# Patient Record
Sex: FEMALE | Race: OTHER | HISPANIC OR LATINO
[De-identification: names, ages, dates, MRNs, and addresses within clinical notes are randomized per-mention and may not be internally consistent; named-entity substitution may affect disease eponyms.]

---

## 2017-08-01 PROBLEM — Z00.00 ENCOUNTER FOR PREVENTIVE HEALTH EXAMINATION: Status: ACTIVE | Noted: 2017-08-01

## 2017-08-09 ENCOUNTER — APPOINTMENT (OUTPATIENT)
Dept: ANTEPARTUM | Facility: CLINIC | Age: 22
End: 2017-08-09
Payer: MEDICAID

## 2017-08-09 ENCOUNTER — ASOB RESULT (OUTPATIENT)
Age: 22
End: 2017-08-09

## 2017-08-09 PROCEDURE — 76813 OB US NUCHAL MEAS 1 GEST: CPT

## 2017-08-09 PROCEDURE — 76801 OB US < 14 WKS SINGLE FETUS: CPT

## 2017-08-09 PROCEDURE — 36416 COLLJ CAPILLARY BLOOD SPEC: CPT

## 2017-09-11 ENCOUNTER — LABORATORY RESULT (OUTPATIENT)
Age: 22
End: 2017-09-11

## 2017-09-12 ENCOUNTER — ASOB RESULT (OUTPATIENT)
Age: 22
End: 2017-09-12

## 2017-09-12 ENCOUNTER — APPOINTMENT (OUTPATIENT)
Dept: ANTEPARTUM | Facility: CLINIC | Age: 22
End: 2017-09-12
Payer: MEDICAID

## 2017-09-12 PROCEDURE — 76805 OB US >/= 14 WKS SNGL FETUS: CPT

## 2017-09-12 PROCEDURE — 36415 COLL VENOUS BLD VENIPUNCTURE: CPT

## 2017-10-12 ENCOUNTER — APPOINTMENT (OUTPATIENT)
Dept: ANTEPARTUM | Facility: CLINIC | Age: 22
End: 2017-10-12
Payer: MEDICAID

## 2017-10-12 ENCOUNTER — ASOB RESULT (OUTPATIENT)
Age: 22
End: 2017-10-12

## 2017-10-12 PROCEDURE — 76817 TRANSVAGINAL US OBSTETRIC: CPT

## 2017-10-12 PROCEDURE — 76811 OB US DETAILED SNGL FETUS: CPT

## 2017-12-12 ENCOUNTER — ASOB RESULT (OUTPATIENT)
Age: 22
End: 2017-12-12

## 2017-12-12 ENCOUNTER — APPOINTMENT (OUTPATIENT)
Dept: ANTEPARTUM | Facility: CLINIC | Age: 22
End: 2017-12-12
Payer: MEDICAID

## 2017-12-12 PROCEDURE — 76816 OB US FOLLOW-UP PER FETUS: CPT

## 2017-12-12 PROCEDURE — 76819 FETAL BIOPHYS PROFIL W/O NST: CPT

## 2018-01-15 ENCOUNTER — OUTPATIENT (OUTPATIENT)
Dept: OUTPATIENT SERVICES | Facility: HOSPITAL | Age: 23
LOS: 1 days | End: 2018-01-15
Payer: MEDICAID

## 2018-01-15 DIAGNOSIS — O47.1 FALSE LABOR AT OR AFTER 37 COMPLETED WEEKS OF GESTATION: ICD-10-CM

## 2018-01-15 PROCEDURE — G0463: CPT

## 2018-01-15 PROCEDURE — 59025 FETAL NON-STRESS TEST: CPT

## 2018-02-06 ENCOUNTER — ASOB RESULT (OUTPATIENT)
Age: 23
End: 2018-02-06

## 2018-02-06 ENCOUNTER — APPOINTMENT (OUTPATIENT)
Dept: ANTEPARTUM | Facility: CLINIC | Age: 23
End: 2018-02-06
Payer: MEDICAID

## 2018-02-06 PROCEDURE — 76819 FETAL BIOPHYS PROFIL W/O NST: CPT

## 2018-02-06 PROCEDURE — 76816 OB US FOLLOW-UP PER FETUS: CPT

## 2018-02-17 ENCOUNTER — RESULT REVIEW (OUTPATIENT)
Age: 23
End: 2018-02-17

## 2018-02-17 ENCOUNTER — INPATIENT (INPATIENT)
Facility: HOSPITAL | Age: 23
LOS: 1 days | Discharge: HOME | End: 2018-02-19
Attending: OBSTETRICS & GYNECOLOGY | Admitting: OBSTETRICS & GYNECOLOGY
Payer: MEDICAID

## 2018-02-17 ENCOUNTER — TRANSCRIPTION ENCOUNTER (OUTPATIENT)
Age: 23
End: 2018-02-17

## 2018-02-17 VITALS — WEIGHT: 138.89 LBS | HEIGHT: 60 IN

## 2018-02-17 DIAGNOSIS — O47.1 FALSE LABOR AT OR AFTER 37 COMPLETED WEEKS OF GESTATION: ICD-10-CM

## 2018-02-17 LAB
ABO RH CONFIRMATION: SIGNIFICANT CHANGE UP
BASE EXCESS BLDCOV CALC-SCNC: -4.2 MMOL/L — LOW (ref -2–2)
BASOPHILS # BLD AUTO: 0 K/UL — SIGNIFICANT CHANGE UP (ref 0–0.2)
BASOPHILS NFR BLD AUTO: 0.1 % — SIGNIFICANT CHANGE UP (ref 0–2)
BLD GP AB SCN SERPL QL: SIGNIFICANT CHANGE UP
EOSINOPHIL # BLD AUTO: 0.1 K/UL — SIGNIFICANT CHANGE UP (ref 0–0.5)
EOSINOPHIL NFR BLD AUTO: 0.7 % — SIGNIFICANT CHANGE UP (ref 0–6)
GAS PNL BLDCOV: 7.34 — SIGNIFICANT CHANGE UP (ref 7.25–7.45)
HCO3 BLDCOV-SCNC: 20 MMOL/L — LOW (ref 21–29)
HCT VFR BLD CALC: 37 % — SIGNIFICANT CHANGE UP (ref 37–47)
HGB BLD-MCNC: 12.2 G/DL — SIGNIFICANT CHANGE UP (ref 12–16)
LYMPHOCYTES # BLD AUTO: 2.2 K/UL — SIGNIFICANT CHANGE UP (ref 1–4.8)
LYMPHOCYTES # BLD AUTO: 20.6 % — SIGNIFICANT CHANGE UP (ref 20–55)
MCHC RBC-ENTMCNC: 29.5 PG — SIGNIFICANT CHANGE UP (ref 27–31)
MCHC RBC-ENTMCNC: 33 G/DL — SIGNIFICANT CHANGE UP (ref 32–36)
MCV RBC AUTO: 89.6 FL — SIGNIFICANT CHANGE UP (ref 81–99)
MONOCYTES # BLD AUTO: 0.7 K/UL — SIGNIFICANT CHANGE UP (ref 0–0.8)
MONOCYTES NFR BLD AUTO: 6.6 % — SIGNIFICANT CHANGE UP (ref 3–10)
NEUTROPHILS # BLD AUTO: 7.7 K/UL — SIGNIFICANT CHANGE UP (ref 1.8–8)
NEUTROPHILS NFR BLD AUTO: 71.4 % — SIGNIFICANT CHANGE UP (ref 37–73)
PCO2 BLDCOV: 38.7 MMHG — SIGNIFICANT CHANGE UP (ref 29–53)
PLATELET # BLD AUTO: 229 K/UL — SIGNIFICANT CHANGE UP (ref 150–400)
PO2 BLDCOA: 24.7 MMHG — SIGNIFICANT CHANGE UP (ref 17–41)
RBC # BLD: 4.13 M/UL — LOW (ref 4.4–5.2)
RBC # FLD: 13.4 % — SIGNIFICANT CHANGE UP (ref 11–15.6)
SAO2 % BLDCOV: SIGNIFICANT CHANGE UP
TYPE + AB SCN PNL BLD: SIGNIFICANT CHANGE UP
WBC # BLD: 10.8 K/UL — SIGNIFICANT CHANGE UP (ref 4.8–10.8)
WBC # FLD AUTO: 10.8 K/UL — SIGNIFICANT CHANGE UP (ref 4.8–10.8)

## 2018-02-17 PROCEDURE — 88307 TISSUE EXAM BY PATHOLOGIST: CPT | Mod: 26

## 2018-02-17 RX ORDER — LANOLIN
1 OINTMENT (GRAM) TOPICAL EVERY 6 HOURS
Qty: 0 | Refills: 0 | Status: DISCONTINUED | OUTPATIENT
Start: 2018-02-17 | End: 2018-02-19

## 2018-02-17 RX ORDER — GLYCERIN ADULT
1 SUPPOSITORY, RECTAL RECTAL AT BEDTIME
Qty: 0 | Refills: 0 | Status: DISCONTINUED | OUTPATIENT
Start: 2018-02-17 | End: 2018-02-19

## 2018-02-17 RX ORDER — CEFAZOLIN SODIUM 1 G
1000 VIAL (EA) INJECTION EVERY 8 HOURS
Qty: 0 | Refills: 0 | Status: DISCONTINUED | OUTPATIENT
Start: 2018-02-18 | End: 2018-02-19

## 2018-02-17 RX ORDER — SODIUM CHLORIDE 9 MG/ML
1000 INJECTION, SOLUTION INTRAVENOUS
Qty: 0 | Refills: 0 | Status: DISCONTINUED | OUTPATIENT
Start: 2018-02-17 | End: 2018-02-18

## 2018-02-17 RX ORDER — AER TRAVELER 0.5 G/1
1 SOLUTION RECTAL; TOPICAL EVERY 4 HOURS
Qty: 0 | Refills: 0 | Status: DISCONTINUED | OUTPATIENT
Start: 2018-02-17 | End: 2018-02-19

## 2018-02-17 RX ORDER — MORPHINE SULFATE 50 MG/1
5 CAPSULE, EXTENDED RELEASE ORAL ONCE
Qty: 0 | Refills: 0 | Status: DISCONTINUED | OUTPATIENT
Start: 2018-02-17 | End: 2018-02-17

## 2018-02-17 RX ORDER — SODIUM CHLORIDE 9 MG/ML
3 INJECTION INTRAMUSCULAR; INTRAVENOUS; SUBCUTANEOUS EVERY 8 HOURS
Qty: 0 | Refills: 0 | Status: DISCONTINUED | OUTPATIENT
Start: 2018-02-17 | End: 2018-02-19

## 2018-02-17 RX ORDER — OXYTOCIN 10 UNIT/ML
333.33 VIAL (ML) INJECTION
Qty: 20 | Refills: 0 | Status: COMPLETED | OUTPATIENT
Start: 2018-02-17 | End: 2018-02-17

## 2018-02-17 RX ORDER — SODIUM CHLORIDE 9 MG/ML
1000 INJECTION, SOLUTION INTRAVENOUS ONCE
Qty: 0 | Refills: 0 | Status: DISCONTINUED | OUTPATIENT
Start: 2018-02-17 | End: 2018-02-18

## 2018-02-17 RX ORDER — OXYTOCIN 10 UNIT/ML
333.33 VIAL (ML) INJECTION
Qty: 20 | Refills: 0 | Status: DISCONTINUED | OUTPATIENT
Start: 2018-02-17 | End: 2018-02-18

## 2018-02-17 RX ORDER — ACETAMINOPHEN 500 MG
650 TABLET ORAL EVERY 6 HOURS
Qty: 0 | Refills: 0 | Status: DISCONTINUED | OUTPATIENT
Start: 2018-02-17 | End: 2018-02-19

## 2018-02-17 RX ORDER — OXYTOCIN 10 UNIT/ML
333.33 VIAL (ML) INJECTION
Qty: 20 | Refills: 0 | Status: DISCONTINUED | OUTPATIENT
Start: 2018-02-17 | End: 2018-02-17

## 2018-02-17 RX ORDER — IBUPROFEN 200 MG
600 TABLET ORAL EVERY 6 HOURS
Qty: 0 | Refills: 0 | Status: DISCONTINUED | OUTPATIENT
Start: 2018-02-17 | End: 2018-02-19

## 2018-02-17 RX ORDER — OXYTOCIN 10 UNIT/ML
41.67 VIAL (ML) INJECTION
Qty: 20 | Refills: 0 | Status: DISCONTINUED | OUTPATIENT
Start: 2018-02-17 | End: 2018-02-19

## 2018-02-17 RX ORDER — OXYCODONE AND ACETAMINOPHEN 5; 325 MG/1; MG/1
2 TABLET ORAL EVERY 6 HOURS
Qty: 0 | Refills: 0 | Status: DISCONTINUED | OUTPATIENT
Start: 2018-02-17 | End: 2018-02-19

## 2018-02-17 RX ORDER — SODIUM CHLORIDE 9 MG/ML
1000 INJECTION, SOLUTION INTRAVENOUS ONCE
Qty: 0 | Refills: 0 | Status: DISCONTINUED | OUTPATIENT
Start: 2018-02-17 | End: 2018-02-17

## 2018-02-17 RX ORDER — DIPHENHYDRAMINE HCL 50 MG
25 CAPSULE ORAL EVERY 6 HOURS
Qty: 0 | Refills: 0 | Status: DISCONTINUED | OUTPATIENT
Start: 2018-02-17 | End: 2018-02-19

## 2018-02-17 RX ORDER — TETANUS TOXOID, REDUCED DIPHTHERIA TOXOID AND ACELLULAR PERTUSSIS VACCINE, ADSORBED 5; 2.5; 8; 8; 2.5 [IU]/.5ML; [IU]/.5ML; UG/.5ML; UG/.5ML; UG/.5ML
0.5 SUSPENSION INTRAMUSCULAR ONCE
Qty: 0 | Refills: 0 | Status: DISCONTINUED | OUTPATIENT
Start: 2018-02-17 | End: 2018-02-19

## 2018-02-17 RX ORDER — SODIUM CHLORIDE 9 MG/ML
1000 INJECTION, SOLUTION INTRAVENOUS
Qty: 0 | Refills: 0 | Status: DISCONTINUED | OUTPATIENT
Start: 2018-02-17 | End: 2018-02-17

## 2018-02-17 RX ORDER — DIBUCAINE 1 %
1 OINTMENT (GRAM) RECTAL EVERY 4 HOURS
Qty: 0 | Refills: 0 | Status: DISCONTINUED | OUTPATIENT
Start: 2018-02-17 | End: 2018-02-19

## 2018-02-17 RX ORDER — PRAMOXINE HYDROCHLORIDE 150 MG/15G
1 AEROSOL, FOAM RECTAL EVERY 4 HOURS
Qty: 0 | Refills: 0 | Status: DISCONTINUED | OUTPATIENT
Start: 2018-02-17 | End: 2018-02-19

## 2018-02-17 RX ORDER — OXYTOCIN 10 UNIT/ML
333.33 VIAL (ML) INJECTION
Qty: 20 | Refills: 0 | Status: COMPLETED | OUTPATIENT
Start: 2018-02-17

## 2018-02-17 RX ORDER — CEFAZOLIN SODIUM 1 G
VIAL (EA) INJECTION
Qty: 0 | Refills: 0 | Status: DISCONTINUED | OUTPATIENT
Start: 2018-02-17 | End: 2018-02-19

## 2018-02-17 RX ORDER — HYDROCORTISONE 1 %
1 OINTMENT (GRAM) TOPICAL EVERY 4 HOURS
Qty: 0 | Refills: 0 | Status: DISCONTINUED | OUTPATIENT
Start: 2018-02-17 | End: 2018-02-19

## 2018-02-17 RX ORDER — DOCUSATE SODIUM 100 MG
100 CAPSULE ORAL
Qty: 0 | Refills: 0 | Status: DISCONTINUED | OUTPATIENT
Start: 2018-02-17 | End: 2018-02-19

## 2018-02-17 RX ORDER — CITRIC ACID/SODIUM CITRATE 300-500 MG
30 SOLUTION, ORAL ORAL ONCE
Qty: 0 | Refills: 0 | Status: DISCONTINUED | OUTPATIENT
Start: 2018-02-17 | End: 2018-02-17

## 2018-02-17 RX ORDER — CITRIC ACID/SODIUM CITRATE 300-500 MG
30 SOLUTION, ORAL ORAL ONCE
Qty: 0 | Refills: 0 | Status: DISCONTINUED | OUTPATIENT
Start: 2018-02-17 | End: 2018-02-18

## 2018-02-17 RX ORDER — CEFAZOLIN SODIUM 1 G
1000 VIAL (EA) INJECTION ONCE
Qty: 0 | Refills: 0 | Status: COMPLETED | OUTPATIENT
Start: 2018-02-17 | End: 2018-02-17

## 2018-02-17 RX ORDER — MAGNESIUM HYDROXIDE 400 MG/1
30 TABLET, CHEWABLE ORAL
Qty: 0 | Refills: 0 | Status: DISCONTINUED | OUTPATIENT
Start: 2018-02-17 | End: 2018-02-19

## 2018-02-17 RX ORDER — SODIUM CHLORIDE 9 MG/ML
1000 INJECTION, SOLUTION INTRAVENOUS ONCE
Qty: 0 | Refills: 0 | Status: DISCONTINUED | OUTPATIENT
Start: 2018-02-17 | End: 2018-02-19

## 2018-02-17 RX ORDER — SIMETHICONE 80 MG/1
80 TABLET, CHEWABLE ORAL EVERY 6 HOURS
Qty: 0 | Refills: 0 | Status: DISCONTINUED | OUTPATIENT
Start: 2018-02-17 | End: 2018-02-19

## 2018-02-17 RX ADMIN — SODIUM CHLORIDE 125 MILLILITER(S): 9 INJECTION, SOLUTION INTRAVENOUS at 20:16

## 2018-02-17 RX ADMIN — MORPHINE SULFATE 5 MILLIGRAM(S): 50 CAPSULE, EXTENDED RELEASE ORAL at 20:30

## 2018-02-17 RX ADMIN — Medication 1000 MILLIUNIT(S)/MIN: at 23:21

## 2018-02-17 RX ADMIN — Medication 100 MILLIGRAM(S): at 22:17

## 2018-02-17 RX ADMIN — MORPHINE SULFATE 5 MILLIGRAM(S): 50 CAPSULE, EXTENDED RELEASE ORAL at 21:00

## 2018-02-18 LAB
ANISOCYTOSIS BLD QL: SLIGHT — SIGNIFICANT CHANGE UP
BASOPHILS # BLD AUTO: 0 K/UL — SIGNIFICANT CHANGE UP (ref 0–0.2)
BASOPHILS NFR BLD AUTO: 0.1 % — SIGNIFICANT CHANGE UP (ref 0–2)
EOSINOPHIL # BLD AUTO: 0.1 K/UL — SIGNIFICANT CHANGE UP (ref 0–0.5)
EOSINOPHIL NFR BLD AUTO: 0.6 % — SIGNIFICANT CHANGE UP (ref 0–6)
HCT VFR BLD CALC: 23.2 % — LOW (ref 37–47)
HCT VFR BLD CALC: 28.6 % — LOW (ref 37–47)
HGB BLD-MCNC: 7.7 G/DL — LOW (ref 12–16)
HGB BLD-MCNC: 9.4 G/DL — LOW (ref 12–16)
HYPOCHROMIA BLD QL: SLIGHT — SIGNIFICANT CHANGE UP
LYMPHOCYTES # BLD AUTO: 18.9 % — LOW (ref 20–55)
LYMPHOCYTES # BLD AUTO: 2.5 K/UL — SIGNIFICANT CHANGE UP (ref 1–4.8)
MACROCYTES BLD QL: SLIGHT — SIGNIFICANT CHANGE UP
MCHC RBC-ENTMCNC: 29.7 PG — SIGNIFICANT CHANGE UP (ref 27–31)
MCHC RBC-ENTMCNC: 33.2 G/DL — SIGNIFICANT CHANGE UP (ref 32–36)
MCV RBC AUTO: 89.6 FL — SIGNIFICANT CHANGE UP (ref 81–99)
MICROCYTES BLD QL: SLIGHT — SIGNIFICANT CHANGE UP
MONOCYTES # BLD AUTO: 1 K/UL — HIGH (ref 0–0.8)
MONOCYTES NFR BLD AUTO: 7.6 % — SIGNIFICANT CHANGE UP (ref 3–10)
NEUTROPHILS # BLD AUTO: 9.5 K/UL — HIGH (ref 1.8–8)
NEUTROPHILS NFR BLD AUTO: 72.4 % — SIGNIFICANT CHANGE UP (ref 37–73)
PLAT MORPH BLD: NORMAL — SIGNIFICANT CHANGE UP
PLATELET # BLD AUTO: 166 K/UL — SIGNIFICANT CHANGE UP (ref 150–400)
POIKILOCYTOSIS BLD QL AUTO: SLIGHT — SIGNIFICANT CHANGE UP
RBC # BLD: 2.59 M/UL — LOW (ref 4.4–5.2)
RBC # FLD: 13.1 % — SIGNIFICANT CHANGE UP (ref 11–15.6)
RBC BLD AUTO: ABNORMAL
WBC # BLD: 13.1 K/UL — HIGH (ref 4.8–10.8)
WBC # FLD AUTO: 13.1 K/UL — HIGH (ref 4.8–10.8)

## 2018-02-18 RX ADMIN — Medication 100 MILLIGRAM(S): at 22:15

## 2018-02-18 RX ADMIN — Medication 100 MILLIGRAM(S): at 06:05

## 2018-02-18 RX ADMIN — Medication 600 MILLIGRAM(S): at 02:32

## 2018-02-18 RX ADMIN — Medication 600 MILLIGRAM(S): at 03:02

## 2018-02-18 RX ADMIN — Medication 100 MILLIGRAM(S): at 15:31

## 2018-02-18 RX ADMIN — Medication 1 TABLET(S): at 14:10

## 2018-02-18 RX ADMIN — SIMETHICONE 80 MILLIGRAM(S): 80 TABLET, CHEWABLE ORAL at 23:31

## 2018-02-18 RX ADMIN — SODIUM CHLORIDE 3 MILLILITER(S): 9 INJECTION INTRAMUSCULAR; INTRAVENOUS; SUBCUTANEOUS at 06:05

## 2018-02-18 RX ADMIN — SODIUM CHLORIDE 3 MILLILITER(S): 9 INJECTION INTRAMUSCULAR; INTRAVENOUS; SUBCUTANEOUS at 22:00

## 2018-02-18 RX ADMIN — SODIUM CHLORIDE 3 MILLILITER(S): 9 INJECTION INTRAMUSCULAR; INTRAVENOUS; SUBCUTANEOUS at 14:11

## 2018-02-18 RX ADMIN — SODIUM CHLORIDE 3 MILLILITER(S): 9 INJECTION INTRAMUSCULAR; INTRAVENOUS; SUBCUTANEOUS at 20:00

## 2018-02-18 RX ADMIN — SIMETHICONE 80 MILLIGRAM(S): 80 TABLET, CHEWABLE ORAL at 14:11

## 2018-02-18 NOTE — PROGRESS NOTE ADULT - ATTENDING COMMENTS
PPD#1  doing well  meeting milestones  currently on ancef for manual removal of placenta  ambulation encouraged  continue current PP care  dispo- home tomorrow

## 2018-02-18 NOTE — DISCHARGE NOTE OB - PLAN OF CARE
Reached Please follow up at [facility] in 6 weeks for postpartum check, take prenatal vitamins as prescribed, continue breast feeding and mother baby bonding. Placenta delivered After the delivery of your baby, the placenta didn't deliver spontaneously after 30 minutes. Therefore, it was necessary to extract it manually. A bedside ultrasound was done to verify that no remnants were left inside the uterus. Given the retained placenta, that caused you to have more bleeding that usual and your hemoglobin levels decreased after your delivery. You need to continue taking iron pills at home, you can take them with orange juice to increase absorption, iron will help you replace the lost hemoglobin. You should also try to eat protein-rich foods such as dairy (milk, cheese), eggs, and meats.

## 2018-02-18 NOTE — DISCHARGE NOTE OB - CARE PLAN
Principal Discharge DX:	Normal spontaneous vaginal delivery  Goal:	Reached  Assessment and plan of treatment:	Please follow up at [facility] in 6 weeks for postpartum check, take prenatal vitamins as prescribed, continue breast feeding and mother baby bonding.  Secondary Diagnosis:	Retained placenta, delivered  Goal:	Placenta delivered  Assessment and plan of treatment:	After the delivery of your baby, the placenta didn't deliver spontaneously after 30 minutes. Therefore, it was necessary to extract it manually. A bedside ultrasound was done to verify that no remnants were left inside the uterus.  Secondary Diagnosis:	Anemia due to blood loss, acute  Assessment and plan of treatment:	Given the retained placenta, that caused you to have more bleeding that usual and your hemoglobin levels decreased after your delivery. You need to continue taking iron pills at home, you can take them with orange juice to increase absorption, iron will help you replace the lost hemoglobin. You should also try to eat protein-rich foods such as dairy (milk, cheese), eggs, and meats.

## 2018-02-18 NOTE — PROGRESS NOTE ADULT - ASSESSMENT
22y years old.  status post spontaneous vaginal delivery at 39 1/7 weeks gestation. Postpartum day # 1 without complications

## 2018-02-18 NOTE — DISCHARGE NOTE OB - MATERIALS PROVIDED
Back To Sleep Handout/Breastfeeding Guide and Packet/MMR Vaccination (VIS Pub Date: 2012)/Guide to Postpartum Care/[facility] Hearing Screen Program/Discharge Medication Information for Patients and Families Pocket Guide/Birth Certificate Instructions/Tdap Vaccination (VIS Pub Date: 2012)/Vaccinations/Breastfeeding Mother’s Support Group Information/[facility]  Screening Program/  Immunization Record/Breastfeeding Log/Shaken Baby Prevention Handout

## 2018-02-18 NOTE — PROGRESS NOTE ADULT - SUBJECTIVE AND OBJECTIVE BOX
Postpartum progress note.  22y years old. G__P__ status post spontaneous vaginal delivery at ___ weeks gestation. Postpartum day # 1    Patient seen and examined at bedside, no acute overnight events.    Subjective: Patient is ambulating, tolerating PO intake of regular diet, voiding, passing flatus, ____ bowel movements. No breast tenderness. _______ feeding. Pain is well controlled. She reports ________ vaginal bleeding, has changed sanitary pads ___ times during the night _______ soaked. Patient denies headache, nausea/vomiting, shortness of breath, fever, chills or calf pain.     Objective:   Vital Signs: Vital Signs Last 24 Hrs  T(C): 37.4 (18 Feb 2018 01:15), Max: 37.4 (18 Feb 2018 01:15)  T(F): 99.4 (18 Feb 2018 01:15), Max: 99.4 (18 Feb 2018 01:15)  HR: 108 (18 Feb 2018 01:15) (76 - 123)  BP: 117/80 (18 Feb 2018 01:15) (95/54 - 129/90)  BP(mean): --  RR: 18 (18 Feb 2018 01:15) (16 - 20)  SpO2: --    Physical Examination:  General: No acute distress.  Lungs: Clear to auscultation bilaterally, no adventitious sounds.  Cardiovascular: Regular rate and rhythm, normal S1/S2, no murmurs.  Breast: ___(no tenderness or engorgement, no abnormal discharge)___  Abdomen: Bowel sounds present, soft, non distended, minimally tender, fundus firm at ____ cm below umbilicus.  Pelvic: Minimal lochia rubra.  Extremities: No edema. No calf tenderness, (-) Oswaldo's sign.    Labs:                        9.4    x     )-----------( x        ( 18 Feb 2018 00:47 )             28.6       Medication:  MEDICATIONS  (STANDING):  ceFAZolin   IVPB      ceFAZolin   IVPB 1000 milliGRAM(s) IV Intermittent every 8 hours  diphtheria/tetanus/pertussis (acellular) Vaccine (ADAcel) 0.5 milliLiter(s) IntraMuscular once  lactated ringers Bolus 1000 milliLiter(s) IV Bolus once  oxytocin Infusion 41.667 milliUNIT(s)/Min (125 mL/Hr) IV Continuous <Continuous>  prenatal multivitamin 1 Tablet(s) Oral daily  sodium chloride 0.9% lock flush 3 milliLiter(s) IV Push every 8 hours    MEDICATIONS  (PRN):  acetaminophen   Tablet 650 milliGRAM(s) Oral every 6 hours PRN For Temp greater than 38.5 C (101.3 F)  acetaminophen   Tablet. 650 milliGRAM(s) Oral every 6 hours PRN Mild Pain  dibucaine 1% Ointment 1 Application(s) Topical every 4 hours PRN Perineal Discomfort  diphenhydrAMINE   Capsule 25 milliGRAM(s) Oral every 6 hours PRN Itching  docusate sodium 100 milliGRAM(s) Oral two times a day PRN Stool Softening  glycerin Suppository - Adult 1 Suppository(s) Rectal at bedtime PRN Constipation  hydrocortisone 1% Cream 1 Application(s) Topical every 4 hours PRN Moderate to Severe Perineal Pain  ibuprofen  Tablet 600 milliGRAM(s) Oral every 6 hours PRN Moderate Pain  lanolin Ointment 1 Application(s) Topical every 6 hours PRN Sore Nipples  magnesium hydroxide Suspension 30 milliLiter(s) Oral two times a day PRN Constipation  oxyCODONE    5 mG/acetaminophen 325 mG 2 Tablet(s) Oral every 6 hours PRN Severe Pain  pramoxine 1%/zinc 5% Cream 1 Application(s) Topical every 4 hours PRN Moderate to Severe Perineal Pain  simethicone 80 milliGRAM(s) Chew every 6 hours PRN Gas  witch hazel Pads 1 Application(s) Topical every 4 hours PRN Perineal Discomfort      Assessment:  22y years old. G__P__ status post spontaneous vaginal delivery at ___ weeks gestation. Postpartum day # 1 without complications    Plan:  Routine Post partum care  Discharge home in AM  Circumcision today

## 2018-02-18 NOTE — PROGRESS NOTE ADULT - SUBJECTIVE AND OBJECTIVE BOX
Postpartum progress note.  22y years old.  status post spontaneous vaginal delivery at 39 1/7 weeks gestation. Postpartum day # 1    Patient seen and examined at bedside, no acute overnight events.    Subjective: Patient is ambulating, tolerating PO intake of regular diet, voiding, passing flatus, hasn't had any bowel movements since the delivery but is passing flatus. No breast tenderness. Breast and formula feeding. Pain is well controlled. She reports minimal vaginal bleeding, has changed sanitary pads twice overnight, partially soaked. Patient denies headache, nausea/vomiting, shortness of breath, fever, chills or calf pain.     Objective:   Vital Signs: Vital Signs Last 24 Hrs  T(C): 36.9 (2018 06:04), Max: 37.4 (2018 01:15)  T(F): 98.4 (2018 06:04), Max: 99.4 (2018 01:15)  HR: 87 (2018 06:04) (76 - 123)  BP: 96/59 (2018 06:04) (95/54 - 129/90)  BP(mean): --  RR: 16 (2018 06:04) (16 - 20)  SpO2: --    Physical Examination:  General: No acute distress.  Lungs: Clear to auscultation bilaterally, no adventitious sounds.  Cardiovascular: Regular rate and rhythm, normal S1/S2, no murmurs.  Breast: No tenderness  Abdomen: Bowel sounds present, soft, non distended, minimally tender, fundus firm at level of umbilicus.  Pelvic: Minimal lochia rubra.  Extremities: No edema. No calf tenderness, (-) Oswaldo's sign.    Labs:                        9.4    x     )-----------( x        ( 2018 00:47 )             28.6       Medication:  MEDICATIONS  (STANDING):  ceFAZolin   IVPB      ceFAZolin   IVPB 1000 milliGRAM(s) IV Intermittent every 8 hours  diphtheria/tetanus/pertussis (acellular) Vaccine (ADAcel) 0.5 milliLiter(s) IntraMuscular once  lactated ringers Bolus 1000 milliLiter(s) IV Bolus once  oxytocin Infusion 41.667 milliUNIT(s)/Min (125 mL/Hr) IV Continuous <Continuous>  prenatal multivitamin 1 Tablet(s) Oral daily  sodium chloride 0.9% lock flush 3 milliLiter(s) IV Push every 8 hours    MEDICATIONS  (PRN):  acetaminophen   Tablet 650 milliGRAM(s) Oral every 6 hours PRN For Temp greater than 38.5 C (101.3 F)  acetaminophen   Tablet. 650 milliGRAM(s) Oral every 6 hours PRN Mild Pain  dibucaine 1% Ointment 1 Application(s) Topical every 4 hours PRN Perineal Discomfort  diphenhydrAMINE   Capsule 25 milliGRAM(s) Oral every 6 hours PRN Itching  docusate sodium 100 milliGRAM(s) Oral two times a day PRN Stool Softening  glycerin Suppository - Adult 1 Suppository(s) Rectal at bedtime PRN Constipation  hydrocortisone 1% Cream 1 Application(s) Topical every 4 hours PRN Moderate to Severe Perineal Pain  ibuprofen  Tablet 600 milliGRAM(s) Oral every 6 hours PRN Moderate Pain  lanolin Ointment 1 Application(s) Topical every 6 hours PRN Sore Nipples  magnesium hydroxide Suspension 30 milliLiter(s) Oral two times a day PRN Constipation  oxyCODONE    5 mG/acetaminophen 325 mG 2 Tablet(s) Oral every 6 hours PRN Severe Pain  pramoxine 1%/zinc 5% Cream 1 Application(s) Topical every 4 hours PRN Moderate to Severe Perineal Pain  simethicone 80 milliGRAM(s) Chew every 6 hours PRN Gas  witch hazel Pads 1 Application(s) Topical every 4 hours PRN Perineal Discomfort

## 2018-02-18 NOTE — DISCHARGE NOTE OB - MEDICATION SUMMARY - MEDICATIONS TO TAKE
I will START or STAY ON the medications listed below when I get home from the hospital:    ibuprofen 600 mg oral tablet  -- 1 tab(s) by mouth every 6 hours, As needed, Moderate Pain  -- Indication: For Pain    Prenatal Multivitamins with Folic Acid 1 mg oral tablet  -- 1 tab(s) by mouth once a day  -- Indication: For Supplement    ferrous sulfate 325 mg (65 mg elemental iron) oral tablet  -- 1 tab(s) by mouth 3 times a day   -- Check with your doctor before becoming pregnant.  Do not chew, break, or crush.  May discolor urine or feces.    -- Indication: For Supplement

## 2018-02-18 NOTE — DISCHARGE NOTE OB - PATIENT PORTAL LINK FT
You can access the [facility] Patient Portal, offered by [facility], by registering with the following website: [email or web address]

## 2018-02-18 NOTE — DISCHARGE NOTE OB - CARE PROVIDER_API CALL
Sylvie Awad), Obstetrics and Gynecology  [facility] [place] [address]  Phone: [phone]  Fax: [phone]

## 2018-02-18 NOTE — DISCHARGE NOTE OB - HOSPITAL COURSE
Patient is a 22 years old now  who delivered via  at 39 1/7 weeks a live female infant weighting 3010g in cephalic presentation with Apgar scores 9 & 9. Placenta did not deliver spontaneously after 30 minutes and, after IV sedation, it was removed manually, bedside US done and no products were seen. Increase bleeding was noted and she received one dose of Methergine IM with good response. Patient's Hb & Hct are 7.7g/dl and 23.2 % respectively. Patient is ambulating, voiding, tolerating PO intake and is having flatus. She is stable and ready for discharge home. F/u at [facility] in 6 weeks for postpartum check

## 2018-02-18 NOTE — PROGRESS NOTE ADULT - SUBJECTIVE AND OBJECTIVE BOX
22y Female s/p   Manual extraction of retained placenta under  MAC IV sedation on 2/17/18    T(C): 36.8 (02-18-18 @ 09:26), Max: 37.4 (02-18-18 @ 01:15)  HR: 90 (02-18-18 @ 09:26) (76 - 123)  BP: 92/46 (02-18-18 @ 09:26) (92/46 - 129/90)  RR: 18 (02-18-18 @ 09:26) (16 - 20)  SpO2: --  Wt(kg): --    Pt seen, doing well, no anesthesia complications or complaints noted or reported.   No Nausea  Pain well controlled

## 2018-02-18 NOTE — PROGRESS NOTE ADULT - PROBLEM SELECTOR PLAN 2
S/p manual extraction of placenta  Patient to receive Ancef 1g p3ilodg for 24 hours.  F/u H&H: 9.4g/dL and 28.6% respectively, monitor for symptomatic anemia.

## 2018-02-19 VITALS
HEART RATE: 89 BPM | RESPIRATION RATE: 18 BRPM | DIASTOLIC BLOOD PRESSURE: 72 MMHG | SYSTOLIC BLOOD PRESSURE: 114 MMHG | TEMPERATURE: 98 F

## 2018-02-19 DIAGNOSIS — D62 ACUTE POSTHEMORRHAGIC ANEMIA: ICD-10-CM

## 2018-02-19 LAB
HCT VFR BLD CALC: 22.3 % — LOW (ref 37–47)
HGB BLD-MCNC: 7.3 G/DL — LOW (ref 12–16)
MCHC RBC-ENTMCNC: 29.7 PG — SIGNIFICANT CHANGE UP (ref 27–31)
MCHC RBC-ENTMCNC: 32.7 G/DL — SIGNIFICANT CHANGE UP (ref 32–36)
MCV RBC AUTO: 90.7 FL — SIGNIFICANT CHANGE UP (ref 81–99)
PLATELET # BLD AUTO: 159 K/UL — SIGNIFICANT CHANGE UP (ref 150–400)
RBC # BLD: 2.46 M/UL — LOW (ref 4.4–5.2)
RBC # FLD: 13.8 % — SIGNIFICANT CHANGE UP (ref 11–15.6)
T PALLIDUM AB TITR SER: NEGATIVE — SIGNIFICANT CHANGE UP
WBC # BLD: 10 K/UL — SIGNIFICANT CHANGE UP (ref 4.8–10.8)
WBC # FLD AUTO: 10 K/UL — SIGNIFICANT CHANGE UP (ref 4.8–10.8)

## 2018-02-19 PROCEDURE — 36415 COLL VENOUS BLD VENIPUNCTURE: CPT

## 2018-02-19 PROCEDURE — 82803 BLOOD GASES ANY COMBINATION: CPT

## 2018-02-19 PROCEDURE — 85018 HEMOGLOBIN: CPT

## 2018-02-19 PROCEDURE — 86780 TREPONEMA PALLIDUM: CPT

## 2018-02-19 PROCEDURE — 86850 RBC ANTIBODY SCREEN: CPT

## 2018-02-19 PROCEDURE — 85027 COMPLETE CBC AUTOMATED: CPT

## 2018-02-19 PROCEDURE — 88307 TISSUE EXAM BY PATHOLOGIST: CPT

## 2018-02-19 PROCEDURE — 86900 BLOOD TYPING SEROLOGIC ABO: CPT

## 2018-02-19 PROCEDURE — 86901 BLOOD TYPING SEROLOGIC RH(D): CPT

## 2018-02-19 PROCEDURE — T1013: CPT

## 2018-02-19 RX ORDER — FERROUS SULFATE 325(65) MG
1 TABLET ORAL
Qty: 90 | Refills: 0
Start: 2018-02-19 | End: 2018-03-20

## 2018-02-19 RX ORDER — IBUPROFEN 200 MG
1 TABLET ORAL
Qty: 40 | Refills: 0
Start: 2018-02-19 | End: 2018-02-28

## 2018-02-19 RX ADMIN — SODIUM CHLORIDE 3 MILLILITER(S): 9 INJECTION INTRAMUSCULAR; INTRAVENOUS; SUBCUTANEOUS at 06:01

## 2018-02-19 RX ADMIN — Medication 1 TABLET(S): at 12:04

## 2018-02-19 RX ADMIN — Medication 100 MILLIGRAM(S): at 06:00

## 2018-02-19 NOTE — PROGRESS NOTE ADULT - PROBLEM SELECTOR PLAN 3
- Asymptomatic anemia.  - Ferrous sulfate PO TID. - Asymptomatic anemia at 7.7 g/dL  - Ferrous sulfate PO TID.  - Repeat H&H prior to discharge

## 2018-02-19 NOTE — PROGRESS NOTE ADULT - SUBJECTIVE AND OBJECTIVE BOX
Postpartum progress note.  22y years old.  status post spontaneous vaginal delivery at 39 1/7 weeks gestation. Postpartum day # 2    Patient seen and examined at bedside, no acute overnight events.    Subjective: Patient is ambulating, tolerating PO intake of regular diet, voiding, hasn't had any bowel movements but is passing flatus. No breast tenderness. Breast and formula feeding. Pain is well controlled. She reports minimal vaginal bleeding, has changed sanitary pads twice overnight, partially soaked. Patient denies headache, nausea/vomiting, shortness of breath, fever, chills or calf pain.     Objective:   Vital Signs: Vital Signs Last 24 Hrs  T(C): 36.8 (2018 23:35), Max: 36.9 (2018 06:04)  T(F): 98.2 (2018 23:35), Max: 98.4 (2018 06:04)  HR: 85 (2018 23:35) (85 - 90)  BP: 109/58 (2018 23:35) (92/46 - 109/58)  BP(mean): --  RR: 18 (2018 23:35) (16 - 18)  SpO2: --    Physical Examination:  General: No acute distress.  Lungs: Clear to auscultation bilaterally, no adventitious sounds.  Cardiovascular: Regular rate and rhythm, normal S1/S2, no murmurs.  Breast: No tenderness  Abdomen: Bowel sounds present, soft, non distended, minimally tender, fundus firm at 1 cm below umbilicus.  Pelvic: Minimal lochia rubra.  Extremities: No edema. No calf tenderness, (-) Oswaldo's sign.    Labs:                        7.7    13.1  )-----------( 166      ( 2018 07:38 )             23.2       Medication:  MEDICATIONS  (STANDING):  ceFAZolin   IVPB      ceFAZolin   IVPB 1000 milliGRAM(s) IV Intermittent every 8 hours  diphtheria/tetanus/pertussis (acellular) Vaccine (ADAcel) 0.5 milliLiter(s) IntraMuscular once  lactated ringers Bolus 1000 milliLiter(s) IV Bolus once  oxytocin Infusion 41.667 milliUNIT(s)/Min (125 mL/Hr) IV Continuous <Continuous>  prenatal multivitamin 1 Tablet(s) Oral daily  sodium chloride 0.9% lock flush 3 milliLiter(s) IV Push every 8 hours    MEDICATIONS  (PRN):  acetaminophen   Tablet 650 milliGRAM(s) Oral every 6 hours PRN For Temp greater than 38.5 C (101.3 F)  acetaminophen   Tablet. 650 milliGRAM(s) Oral every 6 hours PRN Mild Pain  dibucaine 1% Ointment 1 Application(s) Topical every 4 hours PRN Perineal Discomfort  diphenhydrAMINE   Capsule 25 milliGRAM(s) Oral every 6 hours PRN Itching  docusate sodium 100 milliGRAM(s) Oral two times a day PRN Stool Softening  glycerin Suppository - Adult 1 Suppository(s) Rectal at bedtime PRN Constipation  hydrocortisone 1% Cream 1 Application(s) Topical every 4 hours PRN Moderate to Severe Perineal Pain  ibuprofen  Tablet 600 milliGRAM(s) Oral every 6 hours PRN Moderate Pain  lanolin Ointment 1 Application(s) Topical every 6 hours PRN Sore Nipples  magnesium hydroxide Suspension 30 milliLiter(s) Oral two times a day PRN Constipation  oxyCODONE    5 mG/acetaminophen 325 mG 2 Tablet(s) Oral every 6 hours PRN Severe Pain  pramoxine 1%/zinc 5% Cream 1 Application(s) Topical every 4 hours PRN Moderate to Severe Perineal Pain  simethicone 80 milliGRAM(s) Chew every 6 hours PRN Gas  witch hazel Pads 1 Application(s) Topical every 4 hours PRN Perineal Discomfort      Assessment:      Plan:

## 2018-02-19 NOTE — PROGRESS NOTE ADULT - SUBJECTIVE AND OBJECTIVE BOX
A/P 22y on PPD#2 s/p , stable condition, PP precautions reviewed.    ambulating, tolerating Po, lochia decreased, breast feeding, voiding. Patient denies dizziness, blurry vision, and fatigue.   Vital Signs Last 24 Hrs  T(C): 36.8 (2018 23:35), Max: 36.8 (2018 09:26)  T(F): 98.2 (2018 23:35), Max: 98.3 (2018 09:26)  HR: 85 (2018 23:35) (85 - 90)  BP: 109/58 (2018 23:35) (92/46 - 109/58)  BP(mean): --  RR: 18 (2018 23:35) (18 - 18)  SpO2: --    PHYSICAL EXAM:    CHEST/LUNG: Clear to percussion bilaterally; No rales, rhonchi, wheezing, or rubs  HEART: Regular rate and rhythm; No murmurs, rubs, or gallops  BREASTS: deferred  ABDOMEN: Soft, Nontender, Nondistended; fundus is firm and Bowel sounds present  PERINEUM: Intact  and lochia minimal  EXTREMITIES:  2+ Peripheral Pulses, No clubbing, cyanosis, or edema, no CT    MEDICATIONS  (STANDING):  ceFAZolin   IVPB      ceFAZolin   IVPB 1000 milliGRAM(s) IV Intermittent every 8 hours  diphtheria/tetanus/pertussis (acellular) Vaccine (ADAcel) 0.5 milliLiter(s) IntraMuscular once  lactated ringers Bolus 1000 milliLiter(s) IV Bolus once  oxytocin Infusion 41.667 milliUNIT(s)/Min (125 mL/Hr) IV Continuous <Continuous>  prenatal multivitamin 1 Tablet(s) Oral daily  sodium chloride 0.9% lock flush 3 milliLiter(s) IV Push every 8 hours    MEDICATIONS  (PRN):  acetaminophen   Tablet 650 milliGRAM(s) Oral every 6 hours PRN For Temp greater than 38.5 C (101.3 F)  acetaminophen   Tablet. 650 milliGRAM(s) Oral every 6 hours PRN Mild Pain  dibucaine 1% Ointment 1 Application(s) Topical every 4 hours PRN Perineal Discomfort  diphenhydrAMINE   Capsule 25 milliGRAM(s) Oral every 6 hours PRN Itching  docusate sodium 100 milliGRAM(s) Oral two times a day PRN Stool Softening  glycerin Suppository - Adult 1 Suppository(s) Rectal at bedtime PRN Constipation  hydrocortisone 1% Cream 1 Application(s) Topical every 4 hours PRN Moderate to Severe Perineal Pain  ibuprofen  Tablet 600 milliGRAM(s) Oral every 6 hours PRN Moderate Pain  lanolin Ointment 1 Application(s) Topical every 6 hours PRN Sore Nipples  magnesium hydroxide Suspension 30 milliLiter(s) Oral two times a day PRN Constipation  oxyCODONE    5 mG/acetaminophen 325 mG 2 Tablet(s) Oral every 6 hours PRN Severe Pain  pramoxine 1%/zinc 5% Cream 1 Application(s) Topical every 4 hours PRN Moderate to Severe Perineal Pain  simethicone 80 milliGRAM(s) Chew every 6 hours PRN Gas  witch hazel Pads 1 Application(s) Topical every 4 hours PRN Perineal Discomfort        LABS:                        7.3    10.0  )-----------( 159      ( 2018 07:29 )             22.3       1. Pain: well controlled on OPM  2. GI: Regular diet  3. : voiding  4. DVT prophylaxis: ambulate  5. Asymptomatic Anemia, AM CBC  6. Disp: D/C home after CBC results

## 2018-02-19 NOTE — PROGRESS NOTE ADULT - ASSESSMENT
22y years old.  status post spontaneous vaginal delivery at 39 1/7 weeks gestation. Postpartum day # 2 without complications

## 2021-01-25 ENCOUNTER — EMERGENCY (EMERGENCY)
Facility: HOSPITAL | Age: 26
LOS: 1 days | Discharge: HOME | End: 2021-01-25
Payer: MEDICAID

## 2021-01-25 VITALS
HEIGHT: 60 IN | TEMPERATURE: 98 F | RESPIRATION RATE: 20 BRPM | OXYGEN SATURATION: 100 % | DIASTOLIC BLOOD PRESSURE: 89 MMHG | SYSTOLIC BLOOD PRESSURE: 155 MMHG | HEART RATE: 85 BPM

## 2021-01-25 LAB — SARS-COV-2 RNA SPEC QL NAA+PROBE: SIGNIFICANT CHANGE UP

## 2021-01-25 PROCEDURE — 99284 EMERGENCY DEPT VISIT MOD MDM: CPT

## 2021-01-25 PROCEDURE — 99283 EMERGENCY DEPT VISIT LOW MDM: CPT

## 2021-01-25 PROCEDURE — U0005: CPT

## 2021-01-25 PROCEDURE — U0003: CPT

## 2021-01-25 NOTE — ED PROVIDER NOTE - OBJECTIVE STATEMENT
Pt presenting to the ER for COVID-19 testing. Pt reports subjective fevers and body aches since Saturday and is requesting COVID-19 testing at this time.  Denies fevers chills, loss of taste or smell, chest pain or shortness of breath, nausea vomiting diarrhea abdominal pain.

## 2021-01-25 NOTE — ED PROVIDER NOTE - PATIENT PORTAL LINK FT
You can access the FollowMyHealth Patient Portal offered by [facility] by registering at the following website: [email or web address]. By joining [facility]’s FollowMyHealth portal, you will also be able to view your health information using other applications (apps) compatible with our system.

## 2021-01-25 NOTE — ED PROVIDER NOTE - CLINICAL SUMMARY MEDICAL DECISION MAKING FREE TEXT BOX
Pt nontoxic appearing, stable vitals, ambulatory with stable saturation without supplemental oxygen. PT does not meet criteria listed in most updated guidelines as per [facility] protocol/algorithm for admission at this time. pt advised about self-quarantine instructions until negative test results and/or symptom resolution. pt advised on hand hygiene, monitoring of symptoms, antipyretic use as well as and fu with primary care provider. Instructions given in pre-printed copy. COVID-19 PCR sent. Pt given strict return precautions.

## 2021-06-07 ENCOUNTER — EMERGENCY (EMERGENCY)
Facility: HOSPITAL | Age: 26
LOS: 1 days | Discharge: HOME | End: 2021-06-07
Attending: EMERGENCY MEDICINE
Payer: SELF-PAY

## 2021-06-07 VITALS
HEIGHT: 60 IN | TEMPERATURE: 99 F | OXYGEN SATURATION: 100 % | HEART RATE: 86 BPM | RESPIRATION RATE: 18 BRPM | WEIGHT: 162.04 LBS | DIASTOLIC BLOOD PRESSURE: 79 MMHG | SYSTOLIC BLOOD PRESSURE: 117 MMHG

## 2021-06-07 LAB
APPEARANCE UR: ABNORMAL
BACTERIA # UR AUTO: ABNORMAL
BILIRUB UR-MCNC: NEGATIVE — SIGNIFICANT CHANGE UP
COLOR SPEC: YELLOW — SIGNIFICANT CHANGE UP
DIFF PNL FLD: ABNORMAL
EPI CELLS # UR: SIGNIFICANT CHANGE UP
GLUCOSE UR QL: NEGATIVE MG/DL — SIGNIFICANT CHANGE UP
HCG UR QL: NEGATIVE — SIGNIFICANT CHANGE UP
KETONES UR-MCNC: NEGATIVE — SIGNIFICANT CHANGE UP
LEUKOCYTE ESTERASE UR-ACNC: ABNORMAL
NITRITE UR-MCNC: NEGATIVE — SIGNIFICANT CHANGE UP
PH UR: 7 — SIGNIFICANT CHANGE UP (ref 5–8)
PROT UR-MCNC: NEGATIVE MG/DL — SIGNIFICANT CHANGE UP
RBC CASTS # UR COMP ASSIST: SIGNIFICANT CHANGE UP /HPF (ref 0–4)
SP GR SPEC: 1 — LOW (ref 1.01–1.02)
UROBILINOGEN FLD QL: NEGATIVE MG/DL — SIGNIFICANT CHANGE UP
WBC UR QL: SIGNIFICANT CHANGE UP

## 2021-06-07 PROCEDURE — 81025 URINE PREGNANCY TEST: CPT

## 2021-06-07 PROCEDURE — 99284 EMERGENCY DEPT VISIT MOD MDM: CPT

## 2021-06-07 PROCEDURE — 71046 X-RAY EXAM CHEST 2 VIEWS: CPT

## 2021-06-07 PROCEDURE — 72110 X-RAY EXAM L-2 SPINE 4/>VWS: CPT

## 2021-06-07 PROCEDURE — 72050 X-RAY EXAM NECK SPINE 4/5VWS: CPT

## 2021-06-07 PROCEDURE — T1013: CPT

## 2021-06-07 PROCEDURE — 81001 URINALYSIS AUTO W/SCOPE: CPT

## 2021-06-07 PROCEDURE — 72110 X-RAY EXAM L-2 SPINE 4/>VWS: CPT | Mod: 26

## 2021-06-07 PROCEDURE — 71046 X-RAY EXAM CHEST 2 VIEWS: CPT | Mod: 26

## 2021-06-07 PROCEDURE — 72050 X-RAY EXAM NECK SPINE 4/5VWS: CPT | Mod: 26

## 2021-06-07 PROCEDURE — 99284 EMERGENCY DEPT VISIT MOD MDM: CPT | Mod: 25

## 2021-06-07 RX ORDER — IBUPROFEN 200 MG
1 TABLET ORAL
Qty: 28 | Refills: 0
Start: 2021-06-07 | End: 2021-06-13

## 2021-06-07 RX ORDER — METHOCARBAMOL 500 MG/1
750 TABLET, FILM COATED ORAL ONCE
Refills: 0 | Status: COMPLETED | OUTPATIENT
Start: 2021-06-07 | End: 2021-06-07

## 2021-06-07 RX ORDER — METHOCARBAMOL 500 MG/1
1 TABLET, FILM COATED ORAL
Qty: 15 | Refills: 0
Start: 2021-06-07 | End: 2021-06-11

## 2021-06-07 RX ORDER — IBUPROFEN 200 MG
600 TABLET ORAL ONCE
Refills: 0 | Status: COMPLETED | OUTPATIENT
Start: 2021-06-07 | End: 2021-06-07

## 2021-06-07 RX ADMIN — Medication 600 MILLIGRAM(S): at 17:55

## 2021-06-07 RX ADMIN — METHOCARBAMOL 750 MILLIGRAM(S): 500 TABLET, FILM COATED ORAL at 17:55
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
## 2021-06-07 NOTE — ED PROVIDER NOTE - PHYSICAL EXAMINATION
Alert, lucid, and in no apparent distress. Pt is normocephalic, atraumatic.  Pupils are equal, round, lips pink, moist mucous membranes, tongue midline. Neck supple.   Lungs clear to auscultation.mild tenderness left upper lateral horac;Heart regular rate and rhythm, normal S1, S2, no murmurs, gallops, rubs.  Abdomen is soft, nontender, no pulsatile mass, no masses,   Non-focal sensory, 5 out of 5 motor strength, no dysmetria, fluent, goal directed speech. CN2 to 12 intact. Skin without rash, purpura, eccyhmosis  No submandibular adenopathy. Normal mentation, does not appear agitated

## 2021-06-07 NOTE — ED PROVIDER NOTE - NSFOLLOWUPINSTRUCTIONS_ED_ALL_ED_FT
motrin 600mg by mouth every 6 hours  robaxin 750mg by mouth 3 times a day for 5 days  follow up with doctor in 3 - 5 days

## 2021-06-07 NOTE — ED PROVIDER NOTE - PATIENT PORTAL LINK FT
You can access the FollowMyHealth Patient Portal offered by [facility] by registering at the following website: [email or web address]. By joining [facility]’s FollowMyHealth portal, you will also be able to view your health information using other applications (apps) compatible with our system.
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
## 2021-12-27 ENCOUNTER — APPOINTMENT (OUTPATIENT)
Dept: ANTEPARTUM | Facility: CLINIC | Age: 26
End: 2021-12-27

## 2022-01-31 ENCOUNTER — APPOINTMENT (OUTPATIENT)
Dept: ANTEPARTUM | Facility: CLINIC | Age: 27
End: 2022-01-31

## 2022-03-09 ENCOUNTER — APPOINTMENT (OUTPATIENT)
Dept: ANTEPARTUM | Facility: CLINIC | Age: 27
End: 2022-03-09
Payer: SELF-PAY

## 2022-03-09 ENCOUNTER — ASOB RESULT (OUTPATIENT)
Age: 27
End: 2022-03-09

## 2022-03-09 PROCEDURE — 76856 US EXAM PELVIC COMPLETE: CPT | Mod: 59

## 2022-03-09 PROCEDURE — 76830 TRANSVAGINAL US NON-OB: CPT

## 2022-05-31 ENCOUNTER — APPOINTMENT (OUTPATIENT)
Dept: DERMATOLOGY | Facility: CLINIC | Age: 27
End: 2022-05-31

## 2022-06-09 NOTE — ED PROVIDER NOTE - CHPI ED SYMPTOMS NEG
Mae Connors who presents today with a chief complaint of  No chief complaint on file.      Joint Pains: yes  Location: wrist  Onset: ongoing  Intensity: 3 /10  AM Stiffness: none  Alleviating/Aggravating Factors: too many things increase pain  Tolerating Meds: yes  Other:      ROS:  Patient denies having any chest pain, shortness of breath, cough, abdominal pain, nausea, vomiting, rashes, fevers, oral ulcers and recent infections.  Patient admits to having a good appetite      Problem List:  Patient Active Problem List   Diagnosis     Allergic rhinitis     Female stress incontinence     Hypothyroidism     Mild major depression (H)     CARDIOVASCULAR SCREENING; LDL GOAL LESS THAN 160     Rheumatoid arthritis involving both hands with positive rheumatoid factor (H)     Unsatisfactory cervical Papanicolaou smear        PMH:   Past Medical History:   Diagnosis Date     Allergic rhinitis, cause unspecified 1990    Hx of immunotherapy , failed     Arthritis July 2010    rheumatoid and osteo     Depressive disorder     In the past     Family history of breast cancer in female     Sister BRCA pos but patient tested negative 7/2013     Gout 12/21/2012     Renal disease     incontinence     Thyroid disease     hypothyroid     Unsatisfactory cervical Papanicolaou smear 10/22/2020    10/22/20       Surgical History:  Past Surgical History:   Procedure Laterality Date     BIOPSY  October 1, 2015    Skin - negative     CARPAL TUNNEL RELEASE RT/LT Right 09/23/2021     COLONOSCOPY  8-13-12    Dr. Marcelo Briscoe at [facility]     COLONOSCOPY  8/13/2012    Procedure: COLONOSCOPY;  Colonoscopy  ;  Surgeon: Marcelo Briscoe MD, MD;  Location:  GI     EXCISE MASS FOOT Left 7/12/2016    Procedure: EXCISE MASS FOOT;  Surgeon: Marcelo Vaughn DPM;  Location: [facility] NASAL/SINUS SCOPE W THER INSTILL  2005     SLING TRANSPUBO WITHOUT ANTERIOR COLPORRHAPHY  11/21/2011    Procedure:SLING TRANSPUBO WITHOUT ANTERIOR COLPORRHAPHY; Pubovaginal  Alexia; Surgeon:KENNETH NEGRO; Location:[facility] OR       Family History:  Family History   Problem Relation Age of Onset     Unknown/Adopted Father      Coronary Artery Disease Father      Cerebrovascular Disease Father      Prostate Cancer Father      Depression Father      Cerebrovascular Disease Maternal Grandmother      Neurologic Disorder Maternal Grandmother         stroke,  in her 50s     Cerebrovascular Disease Paternal Grandfather      Neurologic Disorder Paternal Grandfather         stroke     Cancer Paternal Grandmother         Unsure what type of cancer     Hyperlipidemia Sister      Breast Cancer Sister        Social History:   reports that she has never smoked. She has never used smokeless tobacco. She reports current alcohol use. She reports that she does not use drugs.    Allergies:  Allergies   Allergen Reactions     Sulfa Drugs      Hives and trouble breathing        Current Medications:  Current Outpatient Medications   Medication Sig Dispense Refill     budesonide (RINOCORT AQUA) 32 MCG/ACT nasal spray Spray 1 spray into both nostrils daily       buPROPion (WELLBUTRIN XL) 150 MG 24 hr tablet TAKE 1 TABLET(150 MG) BY MOUTH EVERY MORNING 90 tablet 0     cetirizine (ZYRTEC) 10 MG tablet Take 1 tablet by mouth every evening. 90 tablet 3     cholecalciferol (VITAMIN D3) 25 mcg (1000 units) capsule Take 1 capsule by mouth daily       folic acid 800 MCG tablet Take 800 mcg by mouth       hydroxychloroquine (PLAQUENIL) 200 MG tablet Take 1 tablet (200 mg) by mouth 2 times daily 180 tablet 0     levothyroxine (SYNTHROID/LEVOTHROID) 88 MCG tablet TAKE 1 TABLET(75 MCG) BY MOUTH DAILY 90 tablet 1     meloxicam (MOBIC) 7.5 MG tablet Take 1 tablet p.o. twice daily or 2 tabs p.o. daily, prn. Take with food. 60 tablet 1     methotrexate 2.5 MG tablet TAKE 3 TABLETS BY MOUTH ONCE WEEKLY 12 tablet 0           Physical Exam:  Following up today via video visit, per Covid-19 pandemic requirements.    Verbal  consent has been obtained for this service by care team member.    Video call start time: 8:49 AM    Video call end time: 8:59 AM    Doximity utilized for video call.    Phone number utilized: [phone]    Patient location for video visit: Home     Provider location for video visit:  Home (working remotely)    Summary/Assessment:    History that includes seropositive rheumatoid arthritis.    Presents for a follow-up visit.    Claims to be doing well with current combination of methotrexate 3 tablets weekly and Plaquenil 200 mg twice a day.    States saw ophthalmology over the past year and told that retinal/visual field testing was fine.    Only utilizes meloxicam on occasions.    Latest drug monitoring labs noted to be stable.    Please see below for management plan.      From prior note(s):       - Just has occasional wrist pains when performing downward dog pose in yoga.    For occasional wrist pains, has wrist splints and utilizes Voltaren gel as needed.    Patient had elevated liver enzymes in March 2020 however this may have been secondary to some drinking, just before [employer] [place] Day.  Methotrexate was then decrease to 8 tablets weekly to 6 tablets weekly     - Was a patient of Dr. Donohue [facility], last seen June 2019.    Pleasant 55-year-old female with pertinent past medical history of a seropositive rheumatoid arthritis, presents to become established with another rheumatologist.    Patient states that she was first diagnosed with rheumatoid arthritis about 3 years ago however believes had symptoms to a milder extent over the past 5 to 10 years.  Diagnosed with rheumatoid arthritis by Dr. Reyna.    States that joints typically involved when her RA is active are wrists and fingers.  On one occasion had foot involvement as well.    Has been on methotrexate for a few years.  Over the past year Plaquenil was added, prednisone was tapered down/discontinued in the process.    Combination of  no headache/no bruising/no difficulty bearing weight methotrexate and Plaquenil has been working well until this past week when she began experiencing left wrist pain.  On exam some synovitis noted involving left wrist, may also have component of left distal forearm/wrist tendinitis.  Patient describes being overactive over holiday weekend in preparing meals which triggered her symptoms.      Pertinent rheumatology/past medical history (please refer to above for more detailed history):      Seropositive rheumatoid arthritis (DX 2016, typically involving hands and wrists)    High risk medication use    Hx right carpal tunnel syndrome, status post surgical intervention (9/2021)    History ehrlichiosis    Episodes of transaminitis    Depression (established with a counselor)    Hypothyroid      Rheumatology medications provided/suggested:    Methotrexate  Folic acid       Pertinent medication from other providers or from otc (please refer to above for more detailed med list):    Glucosamine/chondroitin  Diclofenac gel      Pertinent medications already tried:     Prednisone taper (helpful)  Wrist cortisone injections (orthopedics, helpful)    Pertinent lab history:    2016, noted to have positive/elevated: Rheumatoid factor, CCP antibody    2016, noted to have negative/unremarkable: CHINMAY    Pertinent imaging/test history:        Other:    , has 4 children.  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    Denies regular alcohol beverage intake or tobacco use.    Standing order for labs placed every 2-3 months good through November 2021.    5 foot 2 and weighs about 175 pounds.      On prior visit, advised for occasional mild wrist pains with downward dog yoga pose, recommended if possible modifying this yoga pose and to listen to her body when experiencing pain, should avoid pushing through pain.       Plan:      Continue methotrexate 3 tablets weekly (decreased from 6 tablets due to elevated liver enzymes).  Continue avoiding alcoholic beverages.    Continue  folic acid, takes 800 mcg over-the-counter daily, except the day when taking methotrexate.      Continue Plaquenil 200 mg twice a day for now.  (On a prior visit discussed that if stability maintained and desires she can try cutting back to 300 mg daily).    Continue meloxicam from 7.5 mg daily-twice daily, as needed.  Currently only utilizes sparingly.  Given recent elevation of blood pressure, recommended patient keep a blood pressure log and if level increases while on meloxicam, should hold.    As an alternative to meloxicam can continue utilizing Voltaren gel as needed and/or wrist splint as needed.    Established with orthopedics for wrist pains and right carpal tunnel, status post surgical intervention.    Recheck labs in about 6 weeks.    Follow-up in 4 months.        This note was transcribed using Dragon voice recognition software as a result unintentional grammatical errors or word substitutions may have occurred. Please contact our Rheumatology department if you need any clarification or if you have any related inquiries.          Robert Cotton DO  ....................  6/9/2022   8:11 AM

## 2023-07-10 ENCOUNTER — LABORATORY RESULT (OUTPATIENT)
Age: 28
End: 2023-07-10

## 2023-07-10 ENCOUNTER — APPOINTMENT (OUTPATIENT)
Dept: OBGYN | Facility: CLINIC | Age: 28
End: 2023-07-10
Payer: MEDICAID

## 2023-07-10 VITALS
DIASTOLIC BLOOD PRESSURE: 76 MMHG | BODY MASS INDEX: 33.88 KG/M2 | HEIGHT: 60 IN | WEIGHT: 172.56 LBS | SYSTOLIC BLOOD PRESSURE: 116 MMHG

## 2023-07-10 DIAGNOSIS — Z83.3 FAMILY HISTORY OF DIABETES MELLITUS: ICD-10-CM

## 2023-07-10 DIAGNOSIS — R30.0 DYSURIA: ICD-10-CM

## 2023-07-10 DIAGNOSIS — Z82.49 FAMILY HISTORY OF ISCHEMIC HEART DISEASE AND OTHER DISEASES OF THE CIRCULATORY SYSTEM: ICD-10-CM

## 2023-07-10 DIAGNOSIS — Z78.9 OTHER SPECIFIED HEALTH STATUS: ICD-10-CM

## 2023-07-10 DIAGNOSIS — N39.3 STRESS INCONTINENCE (FEMALE) (MALE): ICD-10-CM

## 2023-07-10 PROCEDURE — 99205 OFFICE O/P NEW HI 60 MIN: CPT

## 2023-07-10 NOTE — HISTORY OF PRESENT ILLNESS
[IUD] : has an intrauterine device [Y] : Positive pregnancy history [Menarche Age: ____] : age at menarche was [unfilled] [PGxTotal] : 1 [[facility]] : 1 [PGHxPremature] : 0 [PGHxAbortions] : 0 [[facility] Centeriving] : 1 [PGHxABInduced] : 0 [PGHxABSpont] : 0 [PGHxEctopic] : 0 [PGHxMultBirths] : 0

## 2023-07-10 NOTE — PHYSICAL EXAM
[Chaperone Present] : A chaperone was present in the examining room during all aspects of the physical examination [FreeTextEntry1] : Ana [Appropriately responsive] : appropriately responsive [Alert] : alert [No Acute Distress] : no acute distress [No Lymphadenopathy] : no lymphadenopathy [Regular Rate Rhythm] : regular rate rhythm [No Murmurs] : no murmurs [Clear to Auscultation B/L] : clear to auscultation bilaterally [Soft] : soft [Non-tender] : non-tender [Non-distended] : non-distended [No HSM] : No HSM [No Lesions] : no lesions [No Mass] : no mass [Oriented x3] : oriented x3 [Examination Of The Breasts] : a normal appearance [No Masses] : no breast masses were palpable [Labia Majora] : normal [Labia Minora] : normal [IUD String] : an IUD string was noted [Normal] : normal [Uterine Adnexae] : normal

## 2023-07-11 LAB
APPEARANCE: CLEAR
BILIRUBIN URINE: NEGATIVE
BLOOD URINE: NEGATIVE
C TRACH RRNA SPEC QL NAA+PROBE: NOT DETECTED
COLOR: YELLOW
GLUCOSE QUALITATIVE U: NEGATIVE MG/DL
HPV HIGH+LOW RISK DNA PNL CVX: NOT DETECTED
KETONES URINE: NEGATIVE MG/DL
LEUKOCYTE ESTERASE URINE: ABNORMAL
N GONORRHOEA RRNA SPEC QL NAA+PROBE: NOT DETECTED
NITRITE URINE: NEGATIVE
PH URINE: 5.5
PROTEIN URINE: NEGATIVE MG/DL
SOURCE TP AMPLIFICATION: NORMAL
SPECIFIC GRAVITY URINE: 1.02
UROBILINOGEN URINE: 0.2 MG/DL

## 2023-07-13 LAB — CYTOLOGY CVX/VAG DOC THIN PREP: NORMAL

## 2023-07-30 ENCOUNTER — EMERGENCY (EMERGENCY)
Facility: HOSPITAL | Age: 28
LOS: 1 days | End: 2023-07-30
Payer: MEDICAID

## 2023-07-30 VITALS
RESPIRATION RATE: 18 BRPM | SYSTOLIC BLOOD PRESSURE: 145 MMHG | OXYGEN SATURATION: 100 % | TEMPERATURE: 98 F | WEIGHT: 175.93 LBS | DIASTOLIC BLOOD PRESSURE: 92 MMHG | HEART RATE: 74 BPM

## 2023-07-30 PROCEDURE — L9991: CPT

## 2023-07-30 NOTE — ED ADULT TRIAGE NOTE - CHIEF COMPLAINT QUOTE
Ambulatory reporting 2 hours of neck pain, took a OTC medication (unsure of name) without relief. Afebrile. Pain starts in front and warps around to back

## 2023-09-12 ENCOUNTER — APPOINTMENT (OUTPATIENT)
Dept: OBGYN | Facility: CLINIC | Age: 28
End: 2023-09-12
Payer: MEDICAID

## 2023-09-12 VITALS
WEIGHT: 172 LBS | BODY MASS INDEX: 33.77 KG/M2 | HEIGHT: 60 IN | SYSTOLIC BLOOD PRESSURE: 112 MMHG | DIASTOLIC BLOOD PRESSURE: 70 MMHG

## 2023-09-12 DIAGNOSIS — Z97.5 PRESENCE OF (INTRAUTERINE) CONTRACEPTIVE DEVICE: ICD-10-CM

## 2023-09-12 PROCEDURE — 58301 REMOVE INTRAUTERINE DEVICE: CPT

## 2023-09-12 PROCEDURE — 81025 URINE PREGNANCY TEST: CPT

## 2023-09-13 LAB
HCG UR QL: NEGATIVE
QUALITY CONTROL: YES

## 2023-09-19 LAB — CORE LAB BIOPSY: NORMAL

## 2023-12-05 ENCOUNTER — APPOINTMENT (OUTPATIENT)
Dept: OBGYN | Facility: CLINIC | Age: 28
End: 2023-12-05
Payer: MEDICAID

## 2023-12-05 VITALS
BODY MASS INDEX: 33.57 KG/M2 | SYSTOLIC BLOOD PRESSURE: 116 MMHG | WEIGHT: 171 LBS | DIASTOLIC BLOOD PRESSURE: 70 MMHG | HEIGHT: 60 IN

## 2023-12-05 DIAGNOSIS — Z78.9 OTHER SPECIFIED HEALTH STATUS: ICD-10-CM

## 2023-12-05 LAB
HCG UR QL: POSITIVE
QUALITY CONTROL: YES

## 2023-12-05 PROCEDURE — 99214 OFFICE O/P EST MOD 30 MIN: CPT

## 2023-12-05 PROCEDURE — 81025 URINE PREGNANCY TEST: CPT

## 2023-12-05 RX ORDER — FLUCONAZOLE 200 MG/1
200 TABLET ORAL
Qty: 1 | Refills: 6 | Status: DISCONTINUED | COMMUNITY
Start: 2023-07-10 | End: 2023-12-05

## 2023-12-05 RX ORDER — NITROFURANTOIN (MONOHYDRATE/MACROCRYSTALS) 25; 75 MG/1; MG/1
100 CAPSULE ORAL
Qty: 14 | Refills: 0 | Status: DISCONTINUED | COMMUNITY
Start: 2023-07-10 | End: 2023-12-05

## 2023-12-05 RX ORDER — NORETHINDRONE ACETATE AND ETHINYL ESTRADIOL AND FERROUS FUMARATE 1.5-30(21)
1.5-3 KIT ORAL DAILY
Qty: 3 | Refills: 1 | Status: DISCONTINUED | COMMUNITY
Start: 2023-09-12 | End: 2023-12-05

## 2023-12-06 LAB
C TRACH RRNA SPEC QL NAA+PROBE: NOT DETECTED
N GONORRHOEA RRNA SPEC QL NAA+PROBE: NOT DETECTED
SOURCE AMPLIFICATION: NORMAL

## 2023-12-19 ENCOUNTER — APPOINTMENT (OUTPATIENT)
Dept: OBGYN | Facility: CLINIC | Age: 28
End: 2023-12-19
Payer: MEDICAID

## 2023-12-19 VITALS
DIASTOLIC BLOOD PRESSURE: 70 MMHG | SYSTOLIC BLOOD PRESSURE: 120 MMHG | BODY MASS INDEX: 33.38 KG/M2 | HEIGHT: 60 IN | WEIGHT: 170 LBS

## 2023-12-19 DIAGNOSIS — N91.1 SECONDARY AMENORRHEA: ICD-10-CM

## 2023-12-19 PROCEDURE — 99214 OFFICE O/P EST MOD 30 MIN: CPT

## 2023-12-19 NOTE — DISCUSSION/SUMMARY
[FreeTextEntry1] : 28-year-old -0-0-1 last menstrual cycle was 2023 presents with positive pregnancy test and bedside sonogram shows intrauterine gestation with fetal heart.  Patient gives history of prediabetes so hemoglobin A1c and glucose level ordered and OB sonogram ordered for dating.  Return in 2 weeks for follow-up.

## 2023-12-19 NOTE — HISTORY OF PRESENT ILLNESS
[FreeTextEntry1] : 28-year-old -0-0-1 last menstrual cycle was 2023 presents with positive pregnancy test.  Bedside sonogram shows intrauterine gestation with fetal heart.  Patient complains of nausea.  Patient gives history of prediabetes.

## 2023-12-20 LAB
ESTIMATED AVERAGE GLUCOSE: 120 MG/DL
GLUCOSE SERPL-MCNC: 56 MG/DL
HBA1C MFR BLD HPLC: 5.8 %

## 2023-12-28 DIAGNOSIS — Z13.71 ENCOUNTER FOR NONPROCREATIVE SCREENING FOR GENETIC DISEASE CARRIER STATUS: ICD-10-CM

## 2024-01-02 ENCOUNTER — APPOINTMENT (OUTPATIENT)
Dept: ANTEPARTUM | Facility: CLINIC | Age: 29
End: 2024-01-02
Payer: MEDICAID

## 2024-01-02 ENCOUNTER — ASOB RESULT (OUTPATIENT)
Age: 29
End: 2024-01-02

## 2024-01-02 PROCEDURE — 76801 OB US < 14 WKS SINGLE FETUS: CPT

## 2024-01-04 ENCOUNTER — NON-APPOINTMENT (OUTPATIENT)
Age: 29
End: 2024-01-04

## 2024-01-05 ENCOUNTER — NON-APPOINTMENT (OUTPATIENT)
Age: 29
End: 2024-01-05

## 2024-01-05 ENCOUNTER — APPOINTMENT (OUTPATIENT)
Dept: OBGYN | Facility: CLINIC | Age: 29
End: 2024-01-05
Payer: MEDICAID

## 2024-01-05 VITALS
DIASTOLIC BLOOD PRESSURE: 78 MMHG | WEIGHT: 168.44 LBS | BODY MASS INDEX: 33.07 KG/M2 | SYSTOLIC BLOOD PRESSURE: 118 MMHG | HEIGHT: 60 IN

## 2024-01-05 LAB
BILIRUB UR QL STRIP: NORMAL
GLUCOSE UR-MCNC: NORMAL
HCG UR QL: 1 EU/DL
HGB UR QL STRIP.AUTO: NORMAL
KETONES UR-MCNC: ABNORMAL
LEUKOCYTE ESTERASE UR QL STRIP: NORMAL
NITRITE UR QL STRIP: NORMAL
PH UR STRIP: 7
PROT UR STRIP-MCNC: ABNORMAL
SP GR UR STRIP: 1.02

## 2024-01-05 PROCEDURE — 0500F INITIAL PRENATAL CARE VISIT: CPT

## 2024-01-06 LAB
ALBUMIN SERPL ELPH-MCNC: 4 G/DL
ALP BLD-CCNC: 72 U/L
ALT SERPL-CCNC: 11 U/L
ANION GAP SERPL CALC-SCNC: 14 MMOL/L
AST SERPL-CCNC: 15 U/L
BILIRUB SERPL-MCNC: <0.2 MG/DL
BUN SERPL-MCNC: 7 MG/DL
CALCIUM SERPL-MCNC: 9.4 MG/DL
CHLORIDE SERPL-SCNC: 103 MMOL/L
CO2 SERPL-SCNC: 22 MMOL/L
CREAT SERPL-MCNC: 0.52 MG/DL
EGFR: 128 ML/MIN/1.73M2
ESTIMATED AVERAGE GLUCOSE: 120 MG/DL
GLUCOSE SERPL-MCNC: 62 MG/DL
HBA1C MFR BLD HPLC: 5.8 %
HCT VFR BLD CALC: 32 %
HGB BLD-MCNC: 10.6 G/DL
HIV1+2 AB SPEC QL IA.RAPID: NONREACTIVE
MCHC RBC-ENTMCNC: 29.2 PG
MCHC RBC-ENTMCNC: 33.1 GM/DL
MCV RBC AUTO: 88.2 FL
PLATELET # BLD AUTO: 283 K/UL
POTASSIUM SERPL-SCNC: 4.3 MMOL/L
PROT SERPL-MCNC: 7 G/DL
RBC # BLD: 3.63 M/UL
RBC # FLD: 12.1 %
SODIUM SERPL-SCNC: 140 MMOL/L
WBC # FLD AUTO: 6.79 K/UL

## 2024-01-08 LAB
CMV IGG SERPL QL: 6.2 U/ML
CMV IGG SERPL-IMP: POSITIVE
CMV IGM SERPL QL: <8 AU/ML
CMV IGM SERPL QL: NEGATIVE
HBV SURFACE AG SER QL: NONREACTIVE
HCV AB SER QL: NONREACTIVE
HCV S/CO RATIO: 0.11 S/CO
HGB A MFR BLD: 97.3 %
HGB A2 MFR BLD: 2.7 %
HGB FRACT BLD-IMP: NORMAL
MEV IGG FLD QL IA: 14.4 AU/ML
MEV IGG+IGM SER-IMP: NORMAL
MUV AB SER-ACNC: NEGATIVE
MUV IGG SER QL IA: <5 AU/ML
RUBV IGG FLD-ACNC: 1.9 INDEX
RUBV IGG SER-IMP: POSITIVE
T GONDII AB SER-IMP: NEGATIVE
T GONDII AB SER-IMP: POSITIVE
T GONDII IGG SER QL: 31.1 IU/ML
T GONDII IGM SER QL: <3 AU/ML
T PALLIDUM AB SER QL IA: NEGATIVE
TSH SERPL-ACNC: 1.69 UIU/ML
VZV AB TITR SER: NEGATIVE
VZV IGG SER IF-ACNC: 133.4 INDEX

## 2024-01-09 LAB
B19V IGG SER QL IA: NEGATIVE
B19V IGG+IGM SER-IMP: NORMAL
B19V IGM FLD-ACNC: NEGATIVE
LEAD BLD-MCNC: <1 UG/DL

## 2024-01-10 LAB
AR GENE MUT ANL BLD/T: NORMAL
FMR1 GENE MUT ANL BLD/T: NORMAL
M TB IFN-G BLD-IMP: NEGATIVE
QUANTIFERON TB PLUS MITOGEN MINUS NIL: 3.43 IU/ML
QUANTIFERON TB PLUS NIL: 0.02 IU/ML
QUANTIFERON TB PLUS TB1 MINUS NIL: 0 IU/ML
QUANTIFERON TB PLUS TB2 MINUS NIL: 0 IU/ML

## 2024-01-16 ENCOUNTER — ASOB RESULT (OUTPATIENT)
Age: 29
End: 2024-01-16

## 2024-01-16 ENCOUNTER — APPOINTMENT (OUTPATIENT)
Dept: ANTEPARTUM | Facility: CLINIC | Age: 29
End: 2024-01-16
Payer: MEDICAID

## 2024-01-16 LAB — CFTR MUT TESTED BLD/T: NEGATIVE

## 2024-01-16 PROCEDURE — 76813 OB US NUCHAL MEAS 1 GEST: CPT

## 2024-01-17 ENCOUNTER — NON-APPOINTMENT (OUTPATIENT)
Age: 29
End: 2024-01-17

## 2024-01-19 LAB
ADDITIONAL US: NORMAL
COMMENTS: AFP: NORMAL
CRL SCAN TWIN B: NORMAL
CRL SCAN: NORMAL
CROWN RUMP LENGTH TWIN B: NORMAL
CROWN RUMP LENGTH: 67.4 MM
DOWN SYNDROME AGE RISK: NORMAL
DOWN SYNDROME INTERPRETATION: NORMAL
DOWN SYNDROME SCREENING RISK: NORMAL
GEST. AGE ON COLLECTION DATE: 12.9 WEEKS
HCG MOM: 1.14
HCG VALUE: 97.6 IU/ML
MATERNAL AGE AT EDD: 31.1 YR
NOTE: AFP: NORMAL
NT MOM TWIN B: NORMAL
NT TWIN B: NORMAL
NUCHAL TRANSLUCENCY (NT): 1.8 MM
NUCHAL TRANSLUCENCY MOM: 1.02
NUMBER OF FETUSES: 1
PAPP-A MOM: 0.73
PAPP-A VALUE: 750.9 NG/ML
RACE: NORMAL
RESULTS AFP: NORMAL
SONOGRAPHER ID#: NORMAL
SUBMIT PART 2 SAMPLE USING: NORMAL
TEST RESULTS: AFP: NORMAL
TRISOMY 18 AGE RISK: NORMAL
TRISOMY 18 INTERPRETATION: NORMAL
TRISOMY 18 SCREENING RISK: NORMAL
WEIGHT AFP: 168 LBS

## 2024-01-22 ENCOUNTER — APPOINTMENT (OUTPATIENT)
Dept: OBGYN | Facility: CLINIC | Age: 29
End: 2024-01-22

## 2024-01-24 ENCOUNTER — NON-APPOINTMENT (OUTPATIENT)
Age: 29
End: 2024-01-24

## 2024-01-24 ENCOUNTER — APPOINTMENT (OUTPATIENT)
Dept: OBGYN | Facility: CLINIC | Age: 29
End: 2024-01-24
Payer: MEDICAID

## 2024-01-24 VITALS
WEIGHT: 167 LBS | BODY MASS INDEX: 32.79 KG/M2 | DIASTOLIC BLOOD PRESSURE: 70 MMHG | SYSTOLIC BLOOD PRESSURE: 100 MMHG | HEIGHT: 60 IN

## 2024-01-24 LAB
BILIRUB UR QL STRIP: NORMAL
GLUCOSE UR-MCNC: NORMAL
HCG UR QL: 0.2 EU/DL
HGB UR QL STRIP.AUTO: NORMAL
KETONES UR-MCNC: NORMAL
LEUKOCYTE ESTERASE UR QL STRIP: NORMAL
NITRITE UR QL STRIP: NORMAL
PH UR STRIP: 6.5
PROT UR STRIP-MCNC: NORMAL
SP GR UR STRIP: 1.02

## 2024-01-24 PROCEDURE — 0502F SUBSEQUENT PRENATAL CARE: CPT

## 2024-01-31 ENCOUNTER — ASOB RESULT (OUTPATIENT)
Age: 29
End: 2024-01-31

## 2024-01-31 ENCOUNTER — APPOINTMENT (OUTPATIENT)
Dept: MATERNAL FETAL MEDICINE | Facility: CLINIC | Age: 29
End: 2024-01-31
Payer: MEDICAID

## 2024-01-31 PROCEDURE — 99442: CPT

## 2024-02-01 ENCOUNTER — APPOINTMENT (OUTPATIENT)
Dept: ANTEPARTUM | Facility: CLINIC | Age: 29
End: 2024-02-01
Payer: MEDICAID

## 2024-02-01 PROCEDURE — 36415 COLL VENOUS BLD VENIPUNCTURE: CPT

## 2024-02-06 ENCOUNTER — NON-APPOINTMENT (OUTPATIENT)
Age: 29
End: 2024-02-06

## 2024-02-07 ENCOUNTER — NON-APPOINTMENT (OUTPATIENT)
Age: 29
End: 2024-02-07

## 2024-02-07 ENCOUNTER — OUTPATIENT (OUTPATIENT)
Dept: OUTPATIENT SERVICES | Facility: HOSPITAL | Age: 29
LOS: 1 days | Discharge: HOME | End: 2024-02-07

## 2024-02-07 DIAGNOSIS — Z34.91 ENCOUNTER FOR SUPERVISION OF NORMAL PREGNANCY, UNSPECIFIED, FIRST TRIMESTER: ICD-10-CM

## 2024-02-07 DIAGNOSIS — D64.9 ANEMIA, UNSPECIFIED: ICD-10-CM

## 2024-02-08 ENCOUNTER — APPOINTMENT (OUTPATIENT)
Dept: HEMATOLOGY ONCOLOGY | Facility: CLINIC | Age: 29
End: 2024-02-08
Payer: MEDICAID

## 2024-02-08 ENCOUNTER — RESULT REVIEW (OUTPATIENT)
Age: 29
End: 2024-02-08

## 2024-02-08 VITALS
WEIGHT: 165 LBS | HEIGHT: 60 IN | BODY MASS INDEX: 32.39 KG/M2 | TEMPERATURE: 97.5 F | OXYGEN SATURATION: 98 % | SYSTOLIC BLOOD PRESSURE: 100 MMHG | HEART RATE: 77 BPM | DIASTOLIC BLOOD PRESSURE: 66 MMHG

## 2024-02-08 LAB
BASOPHILS # BLD AUTO: 0 K/UL — SIGNIFICANT CHANGE UP (ref 0–0.2)
BASOPHILS NFR BLD AUTO: 0.4 % — SIGNIFICANT CHANGE UP (ref 0–2)
EOSINOPHIL # BLD AUTO: 0.3 K/UL — SIGNIFICANT CHANGE UP (ref 0–0.5)
EOSINOPHIL NFR BLD AUTO: 3.4 % — SIGNIFICANT CHANGE UP (ref 0–6)
HCT VFR BLD CALC: 30.7 % — LOW (ref 34.5–45)
HGB BLD-MCNC: 10 G/DL — LOW (ref 11.5–15.5)
LYMPHOCYTES # BLD AUTO: 2.2 K/UL — SIGNIFICANT CHANGE UP (ref 1–3.3)
LYMPHOCYTES # BLD AUTO: 27.5 % — SIGNIFICANT CHANGE UP (ref 13–44)
MCHC RBC-ENTMCNC: 30.2 PG — SIGNIFICANT CHANGE UP (ref 27–34)
MCHC RBC-ENTMCNC: 32.4 G/DL — SIGNIFICANT CHANGE UP (ref 32–36)
MCV RBC AUTO: 93.1 FL — SIGNIFICANT CHANGE UP (ref 80–100)
MONOCYTES # BLD AUTO: 0.3 K/UL — SIGNIFICANT CHANGE UP (ref 0–0.9)
MONOCYTES NFR BLD AUTO: 4 % — SIGNIFICANT CHANGE UP (ref 2–14)
NEUTROPHILS # BLD AUTO: 5.2 K/UL — SIGNIFICANT CHANGE UP (ref 1.8–7.4)
NEUTROPHILS NFR BLD AUTO: 64.8 % — SIGNIFICANT CHANGE UP (ref 43–77)
PLATELET # BLD AUTO: 252 K/UL — SIGNIFICANT CHANGE UP (ref 150–400)
RBC # BLD: 3.3 M/UL — LOW (ref 3.8–5.2)
RBC # FLD: 11.9 % — SIGNIFICANT CHANGE UP (ref 10.3–14.5)
WBC # BLD: 8 K/UL — SIGNIFICANT CHANGE UP (ref 3.8–10.5)
WBC # FLD AUTO: 8 K/UL — SIGNIFICANT CHANGE UP (ref 3.8–10.5)

## 2024-02-08 PROCEDURE — 99205 OFFICE O/P NEW HI 60 MIN: CPT

## 2024-02-08 NOTE — HISTORY OF PRESENT ILLNESS
[de-identified] : Patient is 28 F 4 months pregnant referred for anemia in pregnancy.  Saw OB-GYN 1/2024 who told her she was anemic, Hgb 10.6. Started on oral iron once a day.  Denies fatigue, lightheadedness, SOB, KLEIN, GI/ bleeding.

## 2024-02-08 NOTE — REASON FOR VISIT
[Initial Consultation] : an initial consultation for [Blood Count Assessment] : blood count assessment [Family Member] : family member [Ad Hoc ] : provided by an ad hoc  [Interpreters_Relationshiptopatient] : daughter [TWNoteComboBox1] : [place]

## 2024-02-08 NOTE — ASSESSMENT
[FreeTextEntry1] : Patient is 28 F 4 months pregnant referred for anemia in pregnancy.  #Anemia in pregnancy - will send CBC, iron studies, LDH, hapto, CMP to evaluate - Continue oral iron once daily for now  Follow up in 2 months

## 2024-02-10 ENCOUNTER — EMERGENCY (EMERGENCY)
Facility: HOSPITAL | Age: 29
LOS: 1 days | Discharge: HOME | End: 2024-02-10
Attending: EMERGENCY MEDICINE
Payer: COMMERCIAL

## 2024-02-10 VITALS
DIASTOLIC BLOOD PRESSURE: 65 MMHG | TEMPERATURE: 98 F | WEIGHT: 161.6 LBS | HEART RATE: 91 BPM | RESPIRATION RATE: 20 BRPM | OXYGEN SATURATION: 100 % | HEIGHT: 60 IN | SYSTOLIC BLOOD PRESSURE: 107 MMHG

## 2024-02-10 LAB
RAPID RVP RESULT: DETECTED
SARS-COV-2 RNA SPEC QL NAA+PROBE: DETECTED

## 2024-02-10 PROCEDURE — T1013: CPT

## 2024-02-10 PROCEDURE — 0225U NFCT DS DNA&RNA 21 SARSCOV2: CPT

## 2024-02-10 PROCEDURE — 99283 EMERGENCY DEPT VISIT LOW MDM: CPT

## 2024-02-10 NOTE — ED PROVIDER NOTE - CLINICAL SUMMARY MEDICAL DECISION MAKING FREE TEXT BOX
AVSS, PE unremarkable; supportive care; pregnancy precautions explained; PMD or clinic follow up recommended for reassessment. Patient is aware of signs/symptoms to return to the emergency department.

## 2024-02-10 NOTE — ED ADULT TRIAGE NOTE - CHIEF COMPLAINT QUOTE
Patient presents to ED with c/o subjective fever, body aches, sore throat and headache since 1100 this AM.  Last Tylenol 1830  Patient with h/o anemia.  Patient is currently 16 weeks pregnant.

## 2024-02-10 NOTE — ED PROVIDER NOTE - PATIENT PORTAL LINK FT
You can access the FollowMyHealth Patient Portal offered by [facility] by registering at the following website: [email or web address]. By joining [employer]’s FollowMyHealth portal, you will also be able to view your health information using other applications (apps) compatible with our system.

## 2024-02-10 NOTE — ED PROVIDER NOTE - IV ALTEPLASE EXCL ABS HIDDEN
1/19/2017      Re:   Ron Ng   [facility] [id number]                        This is to certify that Ron Ng is a current patient at the [facility]. Please excuse Ron from school 1/17/19. He may return to sports activities on 1/19/17 as tolerated.    RESTRICTIONS: none.    Sincerely:          SIGNATURE:___________________________________________,   1/19/2017      Saritha Urena NP          [facility]  [address] [facility] [address]   show

## 2024-02-12 ENCOUNTER — NON-APPOINTMENT (OUTPATIENT)
Age: 29
End: 2024-02-12

## 2024-02-12 LAB
ALBUMIN SERPL ELPH-MCNC: 3.9 G/DL
ALP BLD-CCNC: 58 U/L
ALT SERPL-CCNC: 14 U/L
ANION GAP SERPL CALC-SCNC: 14 MMOL/L
AST SERPL-CCNC: 18 U/L
BILIRUB SERPL-MCNC: <0.2 MG/DL
BUN SERPL-MCNC: 4 MG/DL
CALCIUM SERPL-MCNC: 9.1 MG/DL
CHLORIDE SERPL-SCNC: 105 MMOL/L
CO2 SERPL-SCNC: 20 MMOL/L
CREAT SERPL-MCNC: 0.43 MG/DL
EGFR: 134 ML/MIN/1.73M2
FERRITIN SERPL-MCNC: 196 NG/ML
FOLATE SERPL-MCNC: >20 NG/ML
GLUCOSE SERPL-MCNC: 99 MG/DL
HAPTOGLOB SERPL-MCNC: 137 MG/DL
IRON SATN MFR SERPL: 16 %
IRON SERPL-MCNC: 59 UG/DL
LDH SERPL-CCNC: 135 U/L
POTASSIUM SERPL-SCNC: 3.4 MMOL/L
PROT SERPL-MCNC: 6.5 G/DL
SODIUM SERPL-SCNC: 138 MMOL/L
TIBC SERPL-MCNC: 376 UG/DL
UIBC SERPL-MCNC: 317 UG/DL
VIT B12 SERPL-MCNC: 397 PG/ML

## 2024-02-14 ENCOUNTER — NON-APPOINTMENT (OUTPATIENT)
Age: 29
End: 2024-02-14

## 2024-02-14 ENCOUNTER — APPOINTMENT (OUTPATIENT)
Dept: OBGYN | Facility: CLINIC | Age: 29
End: 2024-02-14
Payer: MEDICAID

## 2024-02-14 VITALS
DIASTOLIC BLOOD PRESSURE: 84 MMHG | SYSTOLIC BLOOD PRESSURE: 118 MMHG | BODY MASS INDEX: 32.43 KG/M2 | TEMPERATURE: 98.1 F | WEIGHT: 165.2 LBS | HEIGHT: 60 IN

## 2024-02-14 DIAGNOSIS — Z3A.16 16 WEEKS GESTATION OF PREGNANCY: ICD-10-CM

## 2024-02-14 PROBLEM — D64.9 ANEMIA, UNSPECIFIED: Chronic | Status: ACTIVE | Noted: 2024-02-10

## 2024-02-14 LAB
BILIRUB UR QL STRIP: NORMAL
GLUCOSE UR-MCNC: NORMAL
HCG UR QL: 0.2 EU/DL
HGB UR QL STRIP.AUTO: NORMAL
KETONES UR-MCNC: NORMAL
LEUKOCYTE ESTERASE UR QL STRIP: NORMAL
NITRITE UR QL STRIP: NORMAL
PH UR STRIP: 7
PROT UR STRIP-MCNC: NORMAL
SP GR UR STRIP: 1.02

## 2024-02-14 PROCEDURE — 0502F SUBSEQUENT PRENATAL CARE: CPT

## 2024-02-14 RX ORDER — ONDANSETRON 4 MG/1
4 TABLET ORAL
Qty: 3 | Refills: 2 | Status: ACTIVE | COMMUNITY
Start: 2024-02-14 | End: 1900-01-01

## 2024-02-15 LAB
ADDITIONAL US: NORMAL
AFP MOM: 1.22
AFP VALUE: 36.1 NG/ML
COLLECTED ON 2: NORMAL
COLLECTED ON: NORMAL
CRL SCAN TWIN B: NORMAL
CRL SCAN: NORMAL
CROWN RUMP LENGTH TWIN B: NORMAL
CROWN RUMP LENGTH: 67.4 MM
DIA MOM: 1.06
DIA VALUE: 153.2 PG/ML
DOWN SYNDROME AGE RISK: NORMAL
DOWN SYNDROME INTERPRETATION: NORMAL
DOWN SYNDROME SCREENING RISK: NORMAL
FIRST TRIMESTER SAMPLE: NORMAL
GEST. AGE ON COLLECTION DATE: 12.9 WEEKS
GESTATIONAL AGE: 16 WEEKS
HCG MOM: 1.16
HCG VALUE: 41.2 IU/ML
INSULIN DEP DIABETES: NO
MATERNAL AGE AT EDD: 31.1 YR
NT MOM TWIN B: NORMAL
NT TWIN B: NORMAL
NUCHAL TRANSLUCENCY (NT): 1.8 MM
NUCHAL TRANSLUCENCY MOM: 1.02
NUMBER OF FETUSES: 1
OPEN SPINA BIFIDA: NORMAL
OSB INTERPRETATION: NORMAL
PAPP-A MOM: 0.73
PAPP-A VALUE: 750.9 NG/ML
RACE: NORMAL
SECOND TRIMESTER SAMPLE: NORMAL
SEQUENTIAL 2 COMMENTS: NORMAL
SEQUENTIAL 2 NOTE: NORMAL
SEQUENTIAL 2 RESULTS: NORMAL
SEQUENTIAL 2 TEST RESULTS: NORMAL
SONOGRAPHER ID#: NORMAL
TRISOMY 18 AGE RISK: NORMAL
TRISOMY 18 INTERPRETATION: NORMAL
TRISOMY 18 SCREENING RISK: NORMAL
UE3 MOM: 0.98
UE3 VALUE: 0.9 NG/ML
WEIGHT AFP: 168 LBS
WEIGHT: 167 LBS

## 2024-02-20 ENCOUNTER — NON-APPOINTMENT (OUTPATIENT)
Age: 29
End: 2024-02-20

## 2024-03-05 ENCOUNTER — APPOINTMENT (OUTPATIENT)
Dept: ANTEPARTUM | Facility: CLINIC | Age: 29
End: 2024-03-05
Payer: SELF-PAY

## 2024-03-05 ENCOUNTER — ASOB RESULT (OUTPATIENT)
Age: 29
End: 2024-03-05

## 2024-03-05 LAB
BILIRUB UR QL STRIP: NORMAL
CLARITY UR: CLEAR
COLLECTION METHOD: NORMAL
GLUCOSE UR-MCNC: NORMAL
HCG UR QL: 0.2 EU/DL
HGB UR QL STRIP.AUTO: NORMAL
KETONES UR-MCNC: NORMAL
LEUKOCYTE ESTERASE UR QL STRIP: NORMAL
NITRITE UR QL STRIP: NORMAL
PH UR STRIP: 7
PROT UR STRIP-MCNC: NORMAL
SP GR UR STRIP: 1.01

## 2024-03-05 PROCEDURE — 76811 OB US DETAILED SNGL FETUS: CPT

## 2024-03-06 ENCOUNTER — NON-APPOINTMENT (OUTPATIENT)
Age: 29
End: 2024-03-06

## 2024-03-06 ENCOUNTER — APPOINTMENT (OUTPATIENT)
Dept: OBGYN | Facility: CLINIC | Age: 29
End: 2024-03-06
Payer: SELF-PAY

## 2024-03-06 VITALS
HEIGHT: 60 IN | DIASTOLIC BLOOD PRESSURE: 84 MMHG | WEIGHT: 166.7 LBS | BODY MASS INDEX: 32.73 KG/M2 | SYSTOLIC BLOOD PRESSURE: 122 MMHG

## 2024-03-06 LAB
BILIRUB UR QL STRIP: NORMAL
GLUCOSE UR-MCNC: NORMAL
HCG UR QL: 0.2 EU/DL
HGB UR QL STRIP.AUTO: NORMAL
KETONES UR-MCNC: NORMAL
LEUKOCYTE ESTERASE UR QL STRIP: NORMAL
NITRITE UR QL STRIP: NORMAL
PH UR STRIP: 7
PROT UR STRIP-MCNC: NORMAL
SP GR UR STRIP: 1.01

## 2024-03-06 PROCEDURE — 0502F SUBSEQUENT PRENATAL CARE: CPT

## 2024-03-14 ENCOUNTER — APPOINTMENT (OUTPATIENT)
Dept: MATERNAL FETAL MEDICINE | Facility: CLINIC | Age: 29
End: 2024-03-14
Payer: MEDICAID

## 2024-03-14 ENCOUNTER — ASOB RESULT (OUTPATIENT)
Age: 29
End: 2024-03-14

## 2024-03-14 PROCEDURE — 99442: CPT | Mod: 93

## 2024-03-19 DIAGNOSIS — Z3A.16 16 WEEKS GESTATION OF PREGNANCY: ICD-10-CM

## 2024-03-19 DIAGNOSIS — Z3A.13 13 WEEKS GESTATION OF PREGNANCY: ICD-10-CM

## 2024-03-19 DIAGNOSIS — Z3A.12 12 WEEKS GESTATION OF PREGNANCY: ICD-10-CM

## 2024-03-26 ENCOUNTER — APPOINTMENT (OUTPATIENT)
Dept: OBGYN | Facility: CLINIC | Age: 29
End: 2024-03-26
Payer: MEDICAID

## 2024-03-26 ENCOUNTER — APPOINTMENT (OUTPATIENT)
Dept: ANTEPARTUM | Facility: CLINIC | Age: 29
End: 2024-03-26

## 2024-03-26 VITALS
DIASTOLIC BLOOD PRESSURE: 62 MMHG | SYSTOLIC BLOOD PRESSURE: 102 MMHG | HEIGHT: 60 IN | BODY MASS INDEX: 32.59 KG/M2 | WEIGHT: 166 LBS

## 2024-03-26 PROCEDURE — 0502F SUBSEQUENT PRENATAL CARE: CPT

## 2024-03-27 LAB
BILIRUB UR QL STRIP: NORMAL
GLUCOSE UR-MCNC: NORMAL
HCG UR QL: 0.2 EU/DL
HGB UR QL STRIP.AUTO: NORMAL
KETONES UR-MCNC: NORMAL
LEUKOCYTE ESTERASE UR QL STRIP: NORMAL
NITRITE UR QL STRIP: NORMAL
PH UR STRIP: 6.5
PROT UR STRIP-MCNC: NORMAL
SP GR UR STRIP: 1.01

## 2024-04-01 ENCOUNTER — APPOINTMENT (OUTPATIENT)
Dept: ANTEPARTUM | Facility: CLINIC | Age: 29
End: 2024-04-01

## 2024-04-02 ENCOUNTER — APPOINTMENT (OUTPATIENT)
Dept: ANTEPARTUM | Facility: CLINIC | Age: 29
End: 2024-04-02
Payer: MEDICAID

## 2024-04-02 ENCOUNTER — RESULT REVIEW (OUTPATIENT)
Age: 29
End: 2024-04-02

## 2024-04-02 ENCOUNTER — ASOB RESULT (OUTPATIENT)
Age: 29
End: 2024-04-02

## 2024-04-02 ENCOUNTER — APPOINTMENT (OUTPATIENT)
Dept: HEMATOLOGY ONCOLOGY | Facility: CLINIC | Age: 29
End: 2024-04-02
Payer: MEDICAID

## 2024-04-02 VITALS
WEIGHT: 162.04 LBS | HEIGHT: 60 IN | OXYGEN SATURATION: 100 % | SYSTOLIC BLOOD PRESSURE: 104 MMHG | DIASTOLIC BLOOD PRESSURE: 69 MMHG | TEMPERATURE: 98.3 F | HEART RATE: 79 BPM | BODY MASS INDEX: 31.81 KG/M2

## 2024-04-02 LAB
BASOPHILS # BLD AUTO: 0 K/UL — SIGNIFICANT CHANGE UP (ref 0–0.2)
BASOPHILS NFR BLD AUTO: 0.3 % — SIGNIFICANT CHANGE UP (ref 0–2)
EOSINOPHIL # BLD AUTO: 0.1 K/UL — SIGNIFICANT CHANGE UP (ref 0–0.5)
EOSINOPHIL NFR BLD AUTO: 1.5 % — SIGNIFICANT CHANGE UP (ref 0–6)
HCT VFR BLD CALC: 35.2 % — SIGNIFICANT CHANGE UP (ref 34.5–45)
HGB BLD-MCNC: 11.8 G/DL — SIGNIFICANT CHANGE UP (ref 11.5–15.5)
LYMPHOCYTES # BLD AUTO: 1.4 K/UL — SIGNIFICANT CHANGE UP (ref 1–3.3)
LYMPHOCYTES # BLD AUTO: 19.9 % — SIGNIFICANT CHANGE UP (ref 13–44)
MCHC RBC-ENTMCNC: 30.6 PG — SIGNIFICANT CHANGE UP (ref 27–34)
MCHC RBC-ENTMCNC: 33.5 G/DL — SIGNIFICANT CHANGE UP (ref 32–36)
MCV RBC AUTO: 91.4 FL — SIGNIFICANT CHANGE UP (ref 80–100)
MONOCYTES # BLD AUTO: 0.3 K/UL — SIGNIFICANT CHANGE UP (ref 0–0.9)
MONOCYTES NFR BLD AUTO: 3.7 % — SIGNIFICANT CHANGE UP (ref 2–14)
NEUTROPHILS # BLD AUTO: 5.4 K/UL — SIGNIFICANT CHANGE UP (ref 1.8–7.4)
NEUTROPHILS NFR BLD AUTO: 74.6 % — SIGNIFICANT CHANGE UP (ref 43–77)
PLATELET # BLD AUTO: 260 K/UL — SIGNIFICANT CHANGE UP (ref 150–400)
RBC # BLD: 3.85 M/UL — SIGNIFICANT CHANGE UP (ref 3.8–5.2)
RBC # FLD: 12.5 % — SIGNIFICANT CHANGE UP (ref 10.3–14.5)
WBC # BLD: 7.3 K/UL — SIGNIFICANT CHANGE UP (ref 3.8–10.5)
WBC # FLD AUTO: 7.3 K/UL — SIGNIFICANT CHANGE UP (ref 3.8–10.5)

## 2024-04-02 PROCEDURE — 99213 OFFICE O/P EST LOW 20 MIN: CPT

## 2024-04-02 PROCEDURE — 76816 OB US FOLLOW-UP PER FETUS: CPT

## 2024-04-02 PROCEDURE — T1013: CPT

## 2024-04-02 NOTE — HISTORY OF PRESENT ILLNESS
[de-identified] : Patient is 28 F 6 months pregnant referred for anemia in pregnancy.  Saw OB-GYN 1/2024 who told her she was anemic, Hgb 10.6. Started on oral iron once a day. Denies fatigue, lightheadedness, SOB, KLEIN, GI/ bleeding.   Interval History: On ferrous sulfate BID. Feels well and no complaints. Endorses 1 month of recurrent nose bleeds and bleeding of gums. Never happened before. No FHx of excessive bleeding. Denies hemarthrosis, excessive bruising. Endorses mild rash on chest for past 3 weeks.

## 2024-04-02 NOTE — REASON FOR VISIT
[Follow-Up Visit] : a follow-up visit for [Blood Count Assessment] : blood count assessment [[employer] ] : provided by [employer]   [Time Spent: ____ minutes] : Total time spent using  services: [unfilled] minutes. The patient's primary language is not English thus required  services. [Interpreters_IDNumber] : [id number] [Interpreters_FullName] : Enio [TWNoteComboBox1] : [place]

## 2024-04-02 NOTE — ASSESSMENT
[FreeTextEntry1] : Patient is 28 F 6 months pregnant referred for anemia in pregnancy.  #Anemia in pregnancy - Sending repeat CBC, iron studies, b12/folate - Continue ferrous sulfate BID  #Recurrent nosebleeds - Sending PT/INR, PTT, CMP, vWF panel - Following work-up, will refer to ENT

## 2024-04-03 LAB
ALBUMIN SERPL ELPH-MCNC: 4.2 G/DL
ALP BLD-CCNC: 75 U/L
ALT SERPL-CCNC: 13 U/L
ANION GAP SERPL CALC-SCNC: 14 MMOL/L
APTT BLD: 26.7 SEC
AST SERPL-CCNC: 16 U/L
BILIRUB SERPL-MCNC: 0.2 MG/DL
BUN SERPL-MCNC: 6 MG/DL
CALCIUM SERPL-MCNC: 9.3 MG/DL
CHLORIDE SERPL-SCNC: 103 MMOL/L
CO2 SERPL-SCNC: 19 MMOL/L
CREAT SERPL-MCNC: 0.49 MG/DL
EGFR: 130 ML/MIN/1.73M2
FERRITIN SERPL-MCNC: 115 NG/ML
FOLATE SERPL-MCNC: >20 NG/ML
GLUCOSE SERPL-MCNC: 94 MG/DL
INR PPP: 0.98 RATIO
IRON SATN MFR SERPL: 14 %
IRON SERPL-MCNC: 63 UG/DL
POTASSIUM SERPL-SCNC: 3.6 MMOL/L
PROT SERPL-MCNC: 7 G/DL
PT BLD: 11.2 SEC
SODIUM SERPL-SCNC: 137 MMOL/L
TIBC SERPL-MCNC: 455 UG/DL
UIBC SERPL-MCNC: 391 UG/DL
VIT B12 SERPL-MCNC: 457 PG/ML

## 2024-04-08 DIAGNOSIS — Z3A.22 22 WEEKS GESTATION OF PREGNANCY: ICD-10-CM

## 2024-04-08 DIAGNOSIS — Z31.430 ENCOUNTER OF FEMALE FOR TESTING FOR GENETIC DISEASE CARRIER STATUS FOR PROCREATIVE MANAGEMENT: ICD-10-CM

## 2024-04-08 DIAGNOSIS — Z3A.20 20 WEEKS GESTATION OF PREGNANCY: ICD-10-CM

## 2024-04-12 ENCOUNTER — OUTPATIENT (OUTPATIENT)
Dept: OUTPATIENT SERVICES | Facility: HOSPITAL | Age: 29
LOS: 1 days | End: 2024-04-12
Payer: MEDICAID

## 2024-04-12 VITALS
RESPIRATION RATE: 14 BRPM | TEMPERATURE: 99 F | DIASTOLIC BLOOD PRESSURE: 71 MMHG | HEART RATE: 84 BPM | SYSTOLIC BLOOD PRESSURE: 114 MMHG

## 2024-04-12 VITALS — TEMPERATURE: 98 F | HEART RATE: 71 BPM | DIASTOLIC BLOOD PRESSURE: 65 MMHG | SYSTOLIC BLOOD PRESSURE: 103 MMHG

## 2024-04-12 DIAGNOSIS — O26.899 OTHER SPECIFIED PREGNANCY RELATED CONDITIONS, UNSPECIFIED TRIMESTER: ICD-10-CM

## 2024-04-12 LAB
APPEARANCE UR: ABNORMAL
BACTERIA # UR AUTO: ABNORMAL /HPF
BILIRUB UR-MCNC: NEGATIVE — SIGNIFICANT CHANGE UP
COLOR SPEC: YELLOW — SIGNIFICANT CHANGE UP
DIFF PNL FLD: NEGATIVE — SIGNIFICANT CHANGE UP
GLUCOSE UR QL: NEGATIVE MG/DL — SIGNIFICANT CHANGE UP
KETONES UR-MCNC: ABNORMAL MG/DL
LEUKOCYTE ESTERASE UR-ACNC: ABNORMAL
NITRITE UR-MCNC: NEGATIVE — SIGNIFICANT CHANGE UP
PH UR: 6.5 — SIGNIFICANT CHANGE UP (ref 5–8)
PROT UR-MCNC: SIGNIFICANT CHANGE UP MG/DL
RBC CASTS # UR COMP ASSIST: 1 /HPF — SIGNIFICANT CHANGE UP (ref 0–4)
SP GR SPEC: 1.02 — SIGNIFICANT CHANGE UP (ref 1–1.03)
SQUAMOUS # UR AUTO: 15 /HPF — HIGH (ref 0–5)
UROBILINOGEN FLD QL: 1 MG/DL — SIGNIFICANT CHANGE UP (ref 0.2–1)
WBC UR QL: 3 /HPF — SIGNIFICANT CHANGE UP (ref 0–5)

## 2024-04-12 PROCEDURE — 59025 FETAL NON-STRESS TEST: CPT

## 2024-04-12 PROCEDURE — 59025 FETAL NON-STRESS TEST: CPT | Mod: 26

## 2024-04-12 PROCEDURE — 99212 OFFICE O/P EST SF 10 MIN: CPT | Mod: 25,GC

## 2024-04-12 PROCEDURE — G0463: CPT

## 2024-04-12 PROCEDURE — 81001 URINALYSIS AUTO W/SCOPE: CPT

## 2024-04-12 RX ORDER — ACETAMINOPHEN 500 MG
975 TABLET ORAL ONCE
Refills: 0 | Status: COMPLETED | OUTPATIENT
Start: 2024-04-12 | End: 2024-04-12

## 2024-04-12 RX ADMIN — Medication 975 MILLIGRAM(S): at 22:29

## 2024-04-12 RX ADMIN — Medication 975 MILLIGRAM(S): at 23:34

## 2024-04-12 NOTE — OB PROVIDER TRIAGE NOTE - HISTORY OF PRESENT ILLNESS
GALILEO GRIDER is a 28y  at 25w1d GA who presents to L&D for left sided abdominal pain radiating from her back. Patient states that she has 4/10 pain that started this week that feels like pulling and cramping. Patient denies feeling contractions. Patient denies urinary symptoms. Patient denies any other obstetrical complaints, and endorses good fetal movement.     Pt denies vaginal bleeding, contractions and leakage of fluid. She endorses good fetal movement.     Pt denies trauma.     Pt denies headaches, visual disturbances, RUQ pain, epigastric pain and new-onset edema.     She denies any urinary complaints.     She denies fevers, chills, nausea, vomiting.     She denies shortness of breath, chest pain, and palpitations.    Pregnancy course is  uncomplicated.   Pregnancy course is significant for:    POB:  PGYN: -fibroids/-cysts, denied STD hx, denies abnormal PAPs  PMH:  PSH:  SH: Denies tobacco use, EtOH use and illicit drug use during the pregnancy; Feels safe at home  Meds:  All:    T(C): 37.0 (24 @ 22:03), Max: 37 (24 @ 21:55)  HR: 84 (24 @ 22:02) (84 - 84)  BP: 114/71 (24 @ 22:02) (114/71 - 114/71)  RR: 14 (24 @ 21:55) (14 - 14)  SpO2: --  Gen: NAD, well-appearing  Heart: S1 S2, RRR  Lungs: CTAB  Abd: soft, gravid  Ext: non-edematous, non-tender   SVE:   SSE: cervix visualized, closed and without any signs of bleeding or drainage, no pooling   FHT:   [place]:    A/P:     Fetus:  [place]:  Dispo: Continue to observe.     Discussed with    GALILEO GRIDER is a 28y  at 25w1d GA who presents to L&D for left sided groin pain radiating to vagina. Patient states that she has 4/10 pain that started two days ago that feels like pulling and cramping. She has not taken anything for the pain. Patient denies feeling contractions. Patient denies urinary symptoms or abnormal vaginal discharge. Patient denies any other obstetrical complaints, and endorses good fetal movement.     Pt denies trauma, abuse or accident.    She denies fevers, chills, nausea, vomiting.   Patient does note for past 2 month to have lack of appetite, increased crying and feeling of sadness. She has not talked to anyone about this. Patient denies SI.     Pregnancy course is significant for anemia.     POB:  2018 FT  PGYN: -fibroids/-cysts, denied STD hx, denies abnormal PAPs  PMH: prediabetic  PSH:  denies   SH: Denies tobacco use, EtOH use and illicit drug use during the pregnancy; Feels safe at home  Meds: Iron, PNV  All: NKDA

## 2024-04-12 NOTE — OB PROVIDER TRIAGE NOTE - ATTENDING COMMENTS
28y  at 25w1d GA who presents to L&D for left sided groin pain which resolved with Tylenol, no s/s  labor or placental abruption. Return precautions discussed.  Impression NST: reactive

## 2024-04-12 NOTE — OB PROVIDER TRIAGE NOTE - NSHPPHYSICALEXAM_GEN_ALL_CORE
T(C): 37.0 (04-12-24 @ 22:03), Max: 37 (04-12-24 @ 21:55)  HR: 84 (04-12-24 @ 22:02) (84 - 84)  BP: 114/71 (04-12-24 @ 22:02) (114/71 - 114/71)  RR: 14 (04-12-24 @ 21:55) (14 - 14)  SpO2: --    Gen: NAD, well-appearing  Abd: soft, gravid, nontender  Back: no CVA tenderness  Ext: non-edematous, non-tender   SVE: closed cervical os; no abnormal discharge, bleeding or fluids noted     FHT: 150 bpm, moderate variability, +accels, -deccels   [place]: no ctx T(C): 37.0 (04-12-24 @ 22:03), Max: 37 (04-12-24 @ 21:55)  HR: 84 (04-12-24 @ 22:02) (84 - 84)  BP: 114/71 (04-12-24 @ 22:02) (114/71 - 114/71)  RR: 14 (04-12-24 @ 21:55) (14 - 14)      Gen: NAD, well-appearing  Abd: soft, gravid, nontender  Back: no CVA tenderness  Ext: non-edematous, non-tender   SVE: closed cervical os; no abnormal discharge, bleeding or fluids noted     FHT: 150 bpm, moderate variability, +accels, -deccels   [place]: no ctx

## 2024-04-12 NOTE — OB PROVIDER TRIAGE NOTE - NSOBPROVIDERNOTE_OBGYN_ALL_OB_FT
GALILEO GRIDER is a 28y  at 25w1d GA who presents to L&D for left sided groin pain radiating to vagina.    A/P:   - VSS  - NST reassuring for gestational age; toco with no ctx   - Speculum exam showing closed cervix  - PO Tylenol given and patient stating symptoms improved   - Suspect pain MSK or round ligament pain  - Encourage supportive measure with hot packs, stretching and Tylenol prn.   - Concern for depressive symptoms. Patient with appt with Dr. Johnson on Monday 4/15. Encouraged patient to bring up symptoms with provider for possibility of counseling with talk therapy or other management options. Signs/ symptoms to look out for given. Patient verbalized understanding.   - Patient satisfied and stable for discharge at this time.     Discussed with Dr. Estrella

## 2024-04-15 ENCOUNTER — NON-APPOINTMENT (OUTPATIENT)
Age: 29
End: 2024-04-15

## 2024-04-15 ENCOUNTER — APPOINTMENT (OUTPATIENT)
Dept: OBGYN | Facility: CLINIC | Age: 29
End: 2024-04-15
Payer: MEDICAID

## 2024-04-15 VITALS
WEIGHT: 162.6 LBS | HEART RATE: 80 BPM | DIASTOLIC BLOOD PRESSURE: 67 MMHG | BODY MASS INDEX: 31.92 KG/M2 | HEIGHT: 60 IN | SYSTOLIC BLOOD PRESSURE: 104 MMHG

## 2024-04-15 DIAGNOSIS — Z3A.25 25 WEEKS GESTATION OF PREGNANCY: ICD-10-CM

## 2024-04-15 DIAGNOSIS — D64.9 ANEMIA, UNSPECIFIED: ICD-10-CM

## 2024-04-15 DIAGNOSIS — O99.012 ANEMIA COMPLICATING PREGNANCY, SECOND TRIMESTER: ICD-10-CM

## 2024-04-15 DIAGNOSIS — O26.892 OTHER SPECIFIED PREGNANCY RELATED CONDITIONS, SECOND TRIMESTER: ICD-10-CM

## 2024-04-15 DIAGNOSIS — R10.9 UNSPECIFIED ABDOMINAL PAIN: ICD-10-CM

## 2024-04-15 LAB
BILIRUB UR QL STRIP: NORMAL
GLUCOSE UR-MCNC: NORMAL
HCG UR QL: 0.2 EU/DL
HGB UR QL STRIP.AUTO: NORMAL
KETONES UR-MCNC: NORMAL
LEUKOCYTE ESTERASE UR QL STRIP: NORMAL
NITRITE UR QL STRIP: NORMAL
PH UR STRIP: 6.5
PROT UR STRIP-MCNC: 30
SP GR UR STRIP: 1.03

## 2024-04-15 PROCEDURE — 81003 URINALYSIS AUTO W/O SCOPE: CPT | Mod: QW

## 2024-04-15 PROCEDURE — 0502F SUBSEQUENT PRENATAL CARE: CPT

## 2024-04-16 DIAGNOSIS — Z3A.25 25 WEEKS GESTATION OF PREGNANCY: ICD-10-CM

## 2024-04-16 LAB
GLUCOSE 1H P 50 G GLC PO SERPL-MCNC: 95 MG/DL
HCT VFR BLD CALC: 32.1 %
HGB BLD-MCNC: 10.1 G/DL
MCHC RBC-ENTMCNC: 29.9 PG
MCHC RBC-ENTMCNC: 31.5 GM/DL
MCV RBC AUTO: 95 FL
PLATELET # BLD AUTO: 227 K/UL
RBC # BLD: 3.38 M/UL
RBC # FLD: 13.8 %
T PALLIDUM AB SER QL IA: NEGATIVE
WBC # FLD AUTO: 7.92 K/UL

## 2024-05-01 ENCOUNTER — NON-APPOINTMENT (OUTPATIENT)
Age: 29
End: 2024-05-01

## 2024-05-01 ENCOUNTER — APPOINTMENT (OUTPATIENT)
Dept: OBGYN | Facility: CLINIC | Age: 29
End: 2024-05-01
Payer: MEDICAID

## 2024-05-01 VITALS
HEIGHT: 60 IN | BODY MASS INDEX: 31.22 KG/M2 | DIASTOLIC BLOOD PRESSURE: 60 MMHG | WEIGHT: 159 LBS | SYSTOLIC BLOOD PRESSURE: 100 MMHG

## 2024-05-01 LAB
APPEARANCE: CLEAR
BILIRUBIN URINE: NEGATIVE
BLOOD URINE: NEGATIVE
COLOR: YELLOW
GLUCOSE QUALITATIVE U: NEGATIVE
KETONES URINE: NEGATIVE
LEUKOCYTE ESTERASE URINE: ABNORMAL
NITRITE URINE: NEGATIVE
PH URINE: 7
PROTEIN URINE: NEGATIVE
SPECIFIC GRAVITY URINE: 1.02
UROBILINOGEN URINE: 2 (ref 0.2–?)

## 2024-05-01 PROCEDURE — 0502F SUBSEQUENT PRENATAL CARE: CPT

## 2024-05-02 ENCOUNTER — NON-APPOINTMENT (OUTPATIENT)
Age: 29
End: 2024-05-02

## 2024-05-02 LAB — ABO + RH PNL BLD: NORMAL

## 2024-05-07 ENCOUNTER — NON-APPOINTMENT (OUTPATIENT)
Age: 29
End: 2024-05-07

## 2024-05-20 ENCOUNTER — NON-APPOINTMENT (OUTPATIENT)
Age: 29
End: 2024-05-20

## 2024-05-21 ENCOUNTER — APPOINTMENT (OUTPATIENT)
Dept: OBGYN | Facility: CLINIC | Age: 29
End: 2024-05-21
Payer: MEDICAID

## 2024-05-21 VITALS
BODY MASS INDEX: 31.61 KG/M2 | WEIGHT: 161 LBS | DIASTOLIC BLOOD PRESSURE: 60 MMHG | HEIGHT: 60 IN | SYSTOLIC BLOOD PRESSURE: 90 MMHG

## 2024-05-21 PROCEDURE — 0502F SUBSEQUENT PRENATAL CARE: CPT

## 2024-05-22 ENCOUNTER — NON-APPOINTMENT (OUTPATIENT)
Age: 29
End: 2024-05-22

## 2024-05-22 LAB
APPEARANCE: CLEAR
BILIRUBIN URINE: NEGATIVE
BLOOD URINE: NEGATIVE
COLOR: YELLOW
GLUCOSE QUALITATIVE U: NEGATIVE
KETONES URINE: NEGATIVE
LEUKOCYTE ESTERASE URINE: NEGATIVE
NITRITE URINE: NEGATIVE
PH URINE: 7
PROTEIN URINE: NEGATIVE
SPECIFIC GRAVITY URINE: 1.01
UROBILINOGEN URINE: 0.2 (ref 0.2–?)

## 2024-05-28 ENCOUNTER — OUTPATIENT (OUTPATIENT)
Dept: OUTPATIENT SERVICES | Facility: HOSPITAL | Age: 29
LOS: 1 days | Discharge: HOME | End: 2024-05-28

## 2024-05-28 DIAGNOSIS — D64.9 ANEMIA, UNSPECIFIED: ICD-10-CM

## 2024-05-29 ENCOUNTER — NON-APPOINTMENT (OUTPATIENT)
Age: 29
End: 2024-05-29

## 2024-05-31 ENCOUNTER — NON-APPOINTMENT (OUTPATIENT)
Age: 29
End: 2024-05-31

## 2024-05-31 ENCOUNTER — OUTPATIENT (OUTPATIENT)
Dept: INPATIENT UNIT | Facility: HOSPITAL | Age: 29
LOS: 1 days | End: 2024-05-31
Payer: MEDICAID

## 2024-05-31 VITALS
HEART RATE: 92 BPM | TEMPERATURE: 98 F | SYSTOLIC BLOOD PRESSURE: 113 MMHG | DIASTOLIC BLOOD PRESSURE: 68 MMHG | RESPIRATION RATE: 18 BRPM

## 2024-05-31 VITALS — HEART RATE: 73 BPM | DIASTOLIC BLOOD PRESSURE: 70 MMHG | SYSTOLIC BLOOD PRESSURE: 107 MMHG

## 2024-05-31 DIAGNOSIS — O26.899 OTHER SPECIFIED PREGNANCY RELATED CONDITIONS, UNSPECIFIED TRIMESTER: ICD-10-CM

## 2024-05-31 PROCEDURE — 99221 1ST HOSP IP/OBS SF/LOW 40: CPT | Mod: 25,GC

## 2024-05-31 PROCEDURE — 59025 FETAL NON-STRESS TEST: CPT

## 2024-05-31 PROCEDURE — T1013: CPT

## 2024-05-31 PROCEDURE — G0463: CPT

## 2024-05-31 PROCEDURE — 59025 FETAL NON-STRESS TEST: CPT | Mod: 26

## 2024-05-31 NOTE — OB RN TRIAGE NOTE - AS TEMP SITE
Pt informed of the results below.    Pt asking if Alicja can call her because she wants to set up a nurse coming in to her home and drawing her blood for INR every week.     oral

## 2024-05-31 NOTE — OB RN TRIAGE NOTE - FALL HARM RISK - UNIVERSAL INTERVENTIONS
Bed in lowest position, wheels locked, appropriate side rails in place/Call bell, personal items and telephone in reach/Instruct patient to call for assistance before getting out of bed or chair/Non-slip footwear when patient is out of bed/[place] to call system/Physically safe environment - no spills, clutter or unnecessary equipment/Purposeful Proactive Rounding/Room/bathroom lighting operational, light cord in reach

## 2024-05-31 NOTE — OB RN TRIAGE NOTE - NS_NUMBOFVISITS_OBGYN_ALL_OB_NU
Patient Name: Libby Grimaldo  Patient MRN: [id number]  Patient : 1954    Abbreviated H&P and Pre-sedation Assessment for bone marrow biopsy (procedure name) with sedation    Chief complaint and/or reason for Procedure: multiple myeloma    Planned level of sedation: Minimal - moderate sedation    History of problems with sedation: (patient or family hx): No    ASA Assessment Category: 2 - Mild systemic disease    History of sleep apnea: No    History of blood thinners: No     Appropriate NPO status: Last food was yesterday; sips of water this morning    Current tobacco use: No    No recent fever, chest or sinus congestion, SOB, chest pain.  Some slight cough and some exertional shortness of breath.     Medications   Currently Scheduled Medications       Home Med List)  (Not in a hospital admission)      Allergies  Bupropion    PMH:  Past Medical History:   Diagnosis Date     Cervical radiculopathy      COPD (chronic obstructive pulmonary disease) (H)      Double vision      Febrile seizure (H)     Per patient. Once while a toddler, once while in highschool, and once while in college.     Floaters      Graves disease      HLD (hyperlipidemia)      HTN (hypertension)      IPF (idiopathic pulmonary fibrosis) (H)      Lumbar radiculopathy      Multiple myeloma in relapse (H)      Osteoporosis      Pneumonia     Per patient. Complicated by sepsis.     Recurrent UTI     Per patient. Has required prophylactic antibiotcs.     Vitamin B12 deficiency (non anemic)        Past Surgical History:   Procedure Laterality Date     BONE MARROW BIOPSY, BONE SPECIMEN, NEEDLE/TROCAR Right 2022    Procedure: BIOPSY, BONE MARROW;  Surgeon: Yuniel Tineo;  Location: [facility] OR     CERVICAL FUSION       CHOLECYSTECTOMY       CYSTECTOMY OVARIAN BENIGN       EYE SURGERY      Per patient.     IR CVC TUNNEL PLACEMENT > 5 YRS OF AGE  2022     IR CVC TUNNEL REMOVAL LEFT  2022     PICC DOUBLE LUMEN PLACEMENT Left 2022     Left basilic, 44 cm, 2 cm external length     TONSILLECTOMY       WRIST SURGERY Left        Focused Physical exam pertinent to procedure:          (Details of heart, lung, ASA assessment and mallampati assessment in pre procedure assessment flowsheet)  General- healthy,alert,no distress   Recent vital signs-  /68   Pulse 86   Temp 98.4  F (36.9  C) (Oral)   Resp 18   Wt 49.5 kg (109 lb 1.6 oz)   SpO2 98%   BMI 20.08 kg/m    HEART-regular rate and rhythm and no murmurs, gallops, or rub  LUNGS-Clear to Ausculation  OROPHARYNGEAL - MALLAMPATTI- has upper denture    A/P:Reviewed history, medications, allergies, clinical information and pre procedure assessment. The patient was informed of the risks and benefits of the procedure.  They would like to proceed.  Libby Grimaldo is approved for use of sedation during their procedure as noted above.      Nidia Littlejohn PA-C     8

## 2024-05-31 NOTE — OB RN TRIAGE NOTE - CHIEF COMPLAINT QUOTE
"I have vaginal pain after I accidentally did a split last night, I was crouching down and my shoe slipped" "I have vaginal pain after I accidentally did a split last night, I was crouching down and my shoe slipped. my baby hasn't been moving"

## 2024-05-31 NOTE — OB PROVIDER TRIAGE NOTE - NSOBPROVIDERNOTE_OBGYN_ALL_OB_FT
A/P: GALILEO GRIDER is a 28y  at 23w2d GA who presents to L&D for decreased fetal movement and constant pelvic and lower abdominal pain after squatting down and accidently falling into a split (belly was not hit). At time of presentation, fetal movement has returned to normal but pain has continued. Pain and lack of abnormal fetal US or SROM point toward a musculoskeletal injury to pelvic/perineal region acquired during split. It is suggested patient is given analgesic therapy and sent home.    Fetus:  [place]: no ctx  Dispo:  Patient prescribed Tylenol for pain and be discharged.    Discussed with  A/P: GALILEO GRIDER is a 28y  at 23w2d GA who presents to L&D for decreased fetal movement and constant pelvic and lower abdominal pain for one day.     # dec FM  - Since resolved on admission  - FHT reactive, fetal status reasurring    # Pelvic pain  - Likely MSK  - Not pablo on toco  - Recommend rest and tylenol as needed    # Dispo  - Maternal and fetal status reasurring  - Patient to follow up outpatient with Dr. Johnson within the week  - PTL return precautions given    Discussed with Dr. Lang A/P: GALILEO GRIDER is a 28y  at 32w1d GA who presents to L&D for decreased fetal movement and constant pelvic and lower abdominal pain for one day.     # dec FM  - Since resolved on admission  - FHT reactive, fetal status reasurring    # Pelvic pain  - Likely MSK  - Not pablo on toco  - Recommend rest and tylenol as needed    # Dispo  - Maternal and fetal status reasurring  - Patient to follow up outpatient with Dr. Johnson within the week  - PTL return precautions given    Discussed with Dr. Lang

## 2024-05-31 NOTE — OB PROVIDER TRIAGE NOTE - ATTENDING COMMENTS
28y  at 32w1d GA by LMP 10/19/23 BRIDGET 24 who presents to L&D for decreased fetal movement and constant pelvic and lower abdominal pain. It started 8pm yesterday when patient squatted down and her foot slipped, causing her to slide into a split. Patient reports 8/10 pain along with decreased fetal movement. Pain has continued to the time of presentation. At time of presentation Pt reports a normal return of fetal movement.  HR: 73 (24 @ 13:09) (73 - 92)  BP: 107/70 (24 @ 13:09) (107/70 - 113/68)  FHT - reactive  toco - no contractions  29 y/o  @ 32+1 with musculoskeletal pain after slipping into a split  - pain control with Tylenol PRN  - maternal and fetal status reassuring  - plan d/c home and f/u as scheduled in office    Delia Lang MD

## 2024-05-31 NOTE — OB PROVIDER TRIAGE NOTE - HISTORY OF PRESENT ILLNESS
GALILEO GRIDER is a 28y  at 23w2d GA who presents to L&D for decreased fetal movement and constant pelvic and lower abdominal pain. It started 8pm yesterday when calebtiend squated down and her foot slipped, causing her to do the splits. patient reports 8/10 pain along with decreased fetal movement and a transient leakage of fluid belived to be urine at the time of the incident. Pain has continued to the time of presentation. At time of presentation Pt reports a normal return of fetal movement. Pt reports pain being worse when walking or moving and bettter when she puts her legs together    Pt denies vaginal bleeding, contractions. She endorses good fetal movement. Pt reports transient leakage of fluid at 8pm yeserday.    Pregnancy course is significant for: anemia and depression    POB: prior vaginal delivery   PGYN: -fibroids/-cysts,hx of clamydia during first pregnancy (treated), denies abnormal PAPs  PMH: denies  PSH: denies  SH: Denies tobacco use, EtOH use and illicit drug use during the pregnancy; Feels safe at home  Meds: iron supplements PNV  All: denies   GALILEO GRIDER is a 28y  at 23w2d GA by LMP 10/19/23 BRIDGET 24 who presents to L&D for decreased fetal movement and constant pelvic and lower abdominal pain. It started 8pm yesterday when patient squatted down and her foot slipped, causing her to slide into a split. Patient reports 8/10 pain along with decreased fetal movement. Pain has continued to the time of presentation. At time of presentation Pt reports a normal return of fetal movement. Pt reports pain being worse when walking or moving and bettter when she puts her legs together. Denies VB, LOF, contractions.    PNC w Dr. Johnson, uncomplicated    POB: prior vaginal delivery   PGYN: -fibroids/-cysts,hx of clamydia during first pregnancy (treated), denies abnormal PAPs  PMH: anemia, depression  PSH: denies  SH: Denies tobacco use, EtOH use and illicit drug use during the pregnancy; Feels safe at home  Meds: iron supplements PNV  All: denies   GALILEO GRIDER is a 28y  at 32w1d GA by LMP 10/19/23 BRIDGET 24 who presents to L&D for decreased fetal movement and constant pelvic and lower abdominal pain. It started 8pm yesterday when patient squatted down and her foot slipped, causing her to slide into a split. Patient reports 8/10 pain along with decreased fetal movement. Pain has continued to the time of presentation. At time of presentation Pt reports a normal return of fetal movement. Pt reports pain being worse when walking or moving and bettter when she puts her legs together. Denies VB, LOF, contractions.    PNC w Dr. Johnson, uncomplicated    POB: prior vaginal delivery   PGYN: -fibroids/-cysts,hx of clamydia during first pregnancy (treated), denies abnormal PAPs  PMH: anemia, depression  PSH: denies  SH: Denies tobacco use, EtOH use and illicit drug use during the pregnancy; Feels safe at home  Meds: iron supplements PNV  All: denies

## 2024-05-31 NOTE — OB PROVIDER TRIAGE NOTE - NSHPPHYSICALEXAM_GEN_ALL_CORE
T(C): 36.7 (05-31-24 @ 12:11), Max: 36.7 (05-31-24 @ 12:11)  HR: 73 (05-31-24 @ 13:09) (73 - 92)  BP: 107/70 (05-31-24 @ 13:09) (107/70 - 113/68)  RR: 18 (05-31-24 @ 12:11) (18 - 18)  SpO2: --  Gen: NAD, well-appearing  Heart: S1 S2, RRR  Lungs: CTAB  Abd: soft, gravid  Ext: non-edematous, non-tender   SVE:   SSE: cervix visualized, closed and without any signs of bleeding or drainage, no pooling   FHT: baseline 130, moderate variability, -accel, -decel  [place]: no uterine ctx T(C): 36.7 (05-31-24 @ 12:11), Max: 36.7 (05-31-24 @ 12:11)  HR: 73 (05-31-24 @ 13:09) (73 - 92)  BP: 107/70 (05-31-24 @ 13:09) (107/70 - 113/68)  RR: 18 (05-31-24 @ 12:11) (18 - 18)    Gen: NAD, well-appearing  Abd: soft, gravid  Ext: non-edematous, non-tender   SVE: deferred    FHT: baseline 130, moderate variability, -accel, -decel  [place]: no uterine ctx

## 2024-06-03 ENCOUNTER — RESULT REVIEW (OUTPATIENT)
Age: 29
End: 2024-06-03

## 2024-06-03 ENCOUNTER — APPOINTMENT (OUTPATIENT)
Dept: HEMATOLOGY ONCOLOGY | Facility: CLINIC | Age: 29
End: 2024-06-03
Payer: MEDICAID

## 2024-06-03 VITALS
RESPIRATION RATE: 18 BRPM | OXYGEN SATURATION: 98 % | HEART RATE: 84 BPM | TEMPERATURE: 97.3 F | SYSTOLIC BLOOD PRESSURE: 100 MMHG | BODY MASS INDEX: 29.44 KG/M2 | DIASTOLIC BLOOD PRESSURE: 67 MMHG | HEIGHT: 62 IN | WEIGHT: 160 LBS

## 2024-06-03 DIAGNOSIS — O99.019 ANEMIA COMPLICATING PREGNANCY, UNSPECIFIED TRIMESTER: ICD-10-CM

## 2024-06-03 DIAGNOSIS — R10.9 UNSPECIFIED ABDOMINAL PAIN: ICD-10-CM

## 2024-06-03 DIAGNOSIS — O26.893 OTHER SPECIFIED PREGNANCY RELATED CONDITIONS, THIRD TRIMESTER: ICD-10-CM

## 2024-06-03 DIAGNOSIS — Z3A.32 32 WEEKS GESTATION OF PREGNANCY: ICD-10-CM

## 2024-06-03 DIAGNOSIS — D64.9 ANEMIA, UNSPECIFIED: ICD-10-CM
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
## 2024-06-03 DIAGNOSIS — O36.8130 DECREASED FETAL MOVEMENTS, THIRD TRIMESTER, NOT APPLICABLE OR UNSPECIFIED: ICD-10-CM

## 2024-06-03 DIAGNOSIS — F32.A DEPRESSION, UNSPECIFIED: ICD-10-CM

## 2024-06-03 DIAGNOSIS — O99.013 ANEMIA COMPLICATING PREGNANCY, THIRD TRIMESTER: ICD-10-CM

## 2024-06-03 DIAGNOSIS — O99.343 OTHER MENTAL DISORDERS COMPLICATING PREGNANCY, THIRD TRIMESTER: ICD-10-CM
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
## 2024-06-03 LAB
BASOPHILS # BLD AUTO: 0 K/UL — SIGNIFICANT CHANGE UP (ref 0–0.2)
BASOPHILS NFR BLD AUTO: 0.5 % — SIGNIFICANT CHANGE UP (ref 0–2)
EOSINOPHIL # BLD AUTO: 0.4 K/UL — SIGNIFICANT CHANGE UP (ref 0–0.5)
EOSINOPHIL NFR BLD AUTO: 5.3 % — SIGNIFICANT CHANGE UP (ref 0–6)
HCT VFR BLD CALC: 31.2 % — LOW (ref 34.5–45)
HGB BLD-MCNC: 10.7 G/DL — LOW (ref 11.5–15.5)
LYMPHOCYTES # BLD AUTO: 1.7 K/UL — SIGNIFICANT CHANGE UP (ref 1–3.3)
LYMPHOCYTES # BLD AUTO: 21.1 % — SIGNIFICANT CHANGE UP (ref 13–44)
MCHC RBC-ENTMCNC: 30.9 PG — SIGNIFICANT CHANGE UP (ref 27–34)
MCHC RBC-ENTMCNC: 34.3 G/DL — SIGNIFICANT CHANGE UP (ref 32–36)
MCV RBC AUTO: 90.2 FL — SIGNIFICANT CHANGE UP (ref 80–100)
MONOCYTES # BLD AUTO: 0.4 K/UL — SIGNIFICANT CHANGE UP (ref 0–0.9)
MONOCYTES NFR BLD AUTO: 5.2 % — SIGNIFICANT CHANGE UP (ref 2–14)
NEUTROPHILS # BLD AUTO: 5.6 K/UL — SIGNIFICANT CHANGE UP (ref 1.8–7.4)
NEUTROPHILS NFR BLD AUTO: 67.9 % — SIGNIFICANT CHANGE UP (ref 43–77)
PLATELET # BLD AUTO: 218 K/UL — SIGNIFICANT CHANGE UP (ref 150–400)
RBC # BLD: 3.46 M/UL — LOW (ref 3.8–5.2)
RBC # FLD: 12.3 % — SIGNIFICANT CHANGE UP (ref 10.3–14.5)
WBC # BLD: 8.3 K/UL — SIGNIFICANT CHANGE UP (ref 3.8–10.5)
WBC # FLD AUTO: 8.3 K/UL — SIGNIFICANT CHANGE UP (ref 3.8–10.5)

## 2024-06-03 PROCEDURE — T1013: CPT

## 2024-06-03 PROCEDURE — 99214 OFFICE O/P EST MOD 30 MIN: CPT

## 2024-06-03 NOTE — REASON FOR VISIT
[Follow-Up Visit] : a follow-up visit for [Blood Count Assessment] : blood count assessment [Time Spent: ____ minutes] : Total time spent using  services: [unfilled] minutes. The patient's primary language is not English thus required  services. [Interpreters_IDNumber] : [id number] [TWNoteComboBox1] : [place]e

## 2024-06-03 NOTE — ASSESSMENT
[FreeTextEntry1] : Patient is 28 F 7 months pregnant referred for anemia in pregnancy.  #Anemia in pregnancy - Hgb 10.6, improved from 10.1 (11.8 may have been falsely elevated) - Sending repeat iron studies, b12/folate - Continue ferrous sulfate BID - Encouraged increasing dietary iron intake  #Recurrent nosebleeds - RESOLVED - Normal PT/INR, PTT, CMP, vWF panel - Continue to monitor

## 2024-06-03 NOTE — HISTORY OF PRESENT ILLNESS
[de-identified] : Patient is 28 F 6 months pregnant referred for anemia in pregnancy.  Saw OB-GYN 1/2024 who told her she was anemic, Hgb 10.6. Started on oral iron once a day. Denies fatigue, lightheadedness, SOB, KLEIN, GI/ bleeding.  Interval History: On ferrous sulfate BID. Feels well. Mild fatigue. Nosebleeds resolved. No GI//mucosal bleeding. Denies hemarthrosis, excessive bruising.

## 2024-06-04 ENCOUNTER — NON-APPOINTMENT (OUTPATIENT)
Age: 29
End: 2024-06-04

## 2024-06-04 LAB
ALBUMIN SERPL ELPH-MCNC: 3.6 G/DL
ALP BLD-CCNC: 133 U/L
ALT SERPL-CCNC: 12 U/L
ANION GAP SERPL CALC-SCNC: 14 MMOL/L
AST SERPL-CCNC: 26 U/L
BILIRUB SERPL-MCNC: <0.2 MG/DL
BUN SERPL-MCNC: 9 MG/DL
CALCIUM SERPL-MCNC: 9.3 MG/DL
CHLORIDE SERPL-SCNC: 102 MMOL/L
CO2 SERPL-SCNC: 18 MMOL/L
CREAT SERPL-MCNC: 0.53 MG/DL
EGFR: 128 ML/MIN/1.73M2
FERRITIN SERPL-MCNC: 34 NG/ML
FOLATE SERPL-MCNC: >20 NG/ML
GLUCOSE SERPL-MCNC: 88 MG/DL
IRON SATN MFR SERPL: 29 %
IRON SERPL-MCNC: 138 UG/DL
POTASSIUM SERPL-SCNC: 3.8 MMOL/L
PROT SERPL-MCNC: 6.4 G/DL
SODIUM SERPL-SCNC: 135 MMOL/L
TIBC SERPL-MCNC: 485 UG/DL
UIBC SERPL-MCNC: 347 UG/DL
VIT B12 SERPL-MCNC: 305 PG/ML

## 2024-06-04 RX ORDER — FERROUS SULFATE TAB EC 324 MG (65 MG FE EQUIVALENT) 324 (65 FE) MG
324 (65 FE) TABLET DELAYED RESPONSE ORAL
Qty: 270 | Refills: 1 | Status: ACTIVE | COMMUNITY
Start: 2024-01-24 | End: 1900-01-01

## 2024-06-05 ENCOUNTER — APPOINTMENT (OUTPATIENT)
Dept: OBGYN | Facility: CLINIC | Age: 29
End: 2024-06-05
Payer: MEDICAID

## 2024-06-05 VITALS
HEIGHT: 62 IN | WEIGHT: 161 LBS | BODY MASS INDEX: 29.63 KG/M2 | DIASTOLIC BLOOD PRESSURE: 68 MMHG | SYSTOLIC BLOOD PRESSURE: 112 MMHG

## 2024-06-05 PROCEDURE — 0502F SUBSEQUENT PRENATAL CARE: CPT

## 2024-06-06 ENCOUNTER — NON-APPOINTMENT (OUTPATIENT)
Age: 29
End: 2024-06-06

## 2024-06-06 LAB
APPEARANCE: CLEAR
BILIRUBIN URINE: NEGATIVE
BLOOD URINE: NEGATIVE
COLOR: YELLOW
GLUCOSE QUALITATIVE U: NEGATIVE
KETONES URINE: NEGATIVE
LEUKOCYTE ESTERASE URINE: ABNORMAL
NITRITE URINE: NEGATIVE
PH URINE: 6.5
PROTEIN URINE: NEGATIVE
SPECIFIC GRAVITY URINE: 1.02
UROBILINOGEN URINE: 0.2 (ref 0.2–?)

## 2024-06-07 DIAGNOSIS — Z3A.30 30 WEEKS GESTATION OF PREGNANCY: ICD-10-CM

## 2024-06-07 DIAGNOSIS — Z3A.28 28 WEEKS GESTATION OF PREGNANCY: ICD-10-CM

## 2024-06-07 DIAGNOSIS — Z3A.32 32 WEEKS GESTATION OF PREGNANCY: ICD-10-CM

## 2024-06-10 ENCOUNTER — NON-APPOINTMENT (OUTPATIENT)
Age: 29
End: 2024-06-10

## 2024-06-10 ENCOUNTER — OUTPATIENT (OUTPATIENT)
Dept: OUTPATIENT SERVICES | Facility: HOSPITAL | Age: 29
LOS: 1 days | End: 2024-06-10
Payer: MEDICAID

## 2024-06-10 VITALS
HEART RATE: 71 BPM | TEMPERATURE: 98 F | RESPIRATION RATE: 16 BRPM | DIASTOLIC BLOOD PRESSURE: 61 MMHG | SYSTOLIC BLOOD PRESSURE: 106 MMHG

## 2024-06-10 VITALS
RESPIRATION RATE: 16 BRPM | TEMPERATURE: 99 F | SYSTOLIC BLOOD PRESSURE: 109 MMHG | DIASTOLIC BLOOD PRESSURE: 59 MMHG | HEART RATE: 73 BPM

## 2024-06-10 DIAGNOSIS — O26.899 OTHER SPECIFIED PREGNANCY RELATED CONDITIONS, UNSPECIFIED TRIMESTER: ICD-10-CM

## 2024-06-10 LAB
APPEARANCE UR: CLEAR — SIGNIFICANT CHANGE UP
BILIRUB UR-MCNC: NEGATIVE — SIGNIFICANT CHANGE UP
COLOR SPEC: YELLOW — SIGNIFICANT CHANGE UP
DIFF PNL FLD: NEGATIVE — SIGNIFICANT CHANGE UP
GLUCOSE UR QL: NEGATIVE MG/DL — SIGNIFICANT CHANGE UP
KETONES UR-MCNC: NEGATIVE MG/DL — SIGNIFICANT CHANGE UP
LEUKOCYTE ESTERASE UR-ACNC: NEGATIVE — SIGNIFICANT CHANGE UP
NITRITE UR-MCNC: NEGATIVE — SIGNIFICANT CHANGE UP
PH UR: 8 — SIGNIFICANT CHANGE UP (ref 5–8)
PROT UR-MCNC: SIGNIFICANT CHANGE UP MG/DL
SP GR SPEC: 1.02 — SIGNIFICANT CHANGE UP (ref 1–1.03)
UROBILINOGEN FLD QL: 1 MG/DL — SIGNIFICANT CHANGE UP (ref 0.2–1)

## 2024-06-10 PROCEDURE — 99212 OFFICE O/P EST SF 10 MIN: CPT | Mod: 25

## 2024-06-10 PROCEDURE — 81003 URINALYSIS AUTO W/O SCOPE: CPT

## 2024-06-10 PROCEDURE — G0463: CPT

## 2024-06-10 PROCEDURE — 59025 FETAL NON-STRESS TEST: CPT | Mod: 26

## 2024-06-10 PROCEDURE — 59025 FETAL NON-STRESS TEST: CPT

## 2024-06-10 NOTE — OB PROVIDER TRIAGE NOTE - NSOBPROVIDERNOTE_OBGYN_ALL_OB_FT
27 yo  at 33wk4d presents to L&D triage due to concern of leaking fluid on Saturday, denies any continuous leakage. Patient evalauted for r/o ROM.    #R/o ROM  - SSE no gross pooling  - Cervix closed, acontractile on toco  - NST reactive  - ERASMO 15.5  - ROM unlikely at this time    Dispo: home with  labor precautions and ROM precautions  Discussed with Dr. Estrella 29 yo  at 33wk4d presents to L&D triage due to concern of leaking fluid on Saturday, denies any continuous leakage. Patient evaluated for r/o ROM.    #R/o ROM  - SSE no gross pooling  - Cervix closed, acontractile on toco  - NST reactive  - ERASMO 15.5  - ROM unlikely at this time  - UA negative    Dispo: home with  labor precautions and ROM precautions  Discussed with Dr. Estrella

## 2024-06-10 NOTE — OB PROVIDER TRIAGE NOTE - HISTORY OF PRESENT ILLNESS
27 yo  at 33wk4d presents to L&D triage due to concern of leaking fluid on Saturday. Patient reports she had some fluid on Saturday, but wasn't too concerned about it. She denies any continuous leakage, just reports passing some fluid. She called her OBGYN today and told them about her symptoms and he recommended evaluation in triage for r/o rupture. She reports occasional abdominal cramping but has no significant ctx like pain, reports fetal movement. Patient has a runny nose and cough which she attributes to seasonal allergies.     LMP 10/19/23  BRIDGET 24  Patient receives care with Dr. Johnson    POBHx:   PGynHx: denies  PMHx: denies  PSHx: denies  Meds: PNV  All: seasonal allergies 29 yo  at 33wk4d presents to L&D triage due to concern of leaking fluid on Saturday. Patient reports she had some fluid on Saturday, but wasn't too concerned about it. She denies any continuous leakage, just reports passing some fluid. She called her OBGYN today and told them about her symptoms and he recommended evaluation in triage for r/o rupture. She reports occasional abdominal cramping but has no significant ctx like pain, reports fetal movement. Patient has a runny nose and cough which she attributes to seasonal allergies. She reports dysuria and voiding more frequently.     LMP 10/19/23  BRIDGET 24  Patient receives care with Dr. Johnson    POBHx:   PGynHx: denies  PMHx: denies  PSHx: denies  Meds: PNV  All: seasonal allergies

## 2024-06-10 NOTE — OB PROVIDER TRIAGE NOTE - ATTENDING COMMENTS
29 yo  at 33wk4d presents to L&D triage due to concern of leaking fluid not from amniotic fluid, reassuring maternal and fetal testing, return precautions discussed.  Impression NST: reactive

## 2024-06-10 NOTE — OB PROVIDER TRIAGE NOTE - NSHPPHYSICALEXAM_GEN_ALL_CORE
OBJECTIVE:  Vital Signs Last 24 Hrs  T(C): 37.1 (10 Kb 2024 16:53), Max: 37.1 (10 Kb 2024 16:53)  T(F): 98.78 (10 Kb 2024 16:53), Max: 98.78 (10 Kb 2024 16:53)  HR: 73 (10 Kb 2024 16:53) (71 - 73)  BP: 109/59 (10 Kb 2024 16:53) (106/61 - 109/59)    Physical Exam:  Gen: NAD, well-appearing, AAOx3   Abd: Soft, gravid  Ext: non-tender, non-edematous  SSE: no gross fluid, minimal physiologic discharge  SVE: 0/30/-3  Nurse present at bedside during all components of physical exam.     Bedside sono: Vtx, placenta posterior  FHT: Baseline 140bpm, mod chantel, +accel, -decel  [place]: no contractions

## 2024-06-12 DIAGNOSIS — O99.013 ANEMIA COMPLICATING PREGNANCY, THIRD TRIMESTER: ICD-10-CM

## 2024-06-12 DIAGNOSIS — D64.9 ANEMIA, UNSPECIFIED: ICD-10-CM

## 2024-06-12 DIAGNOSIS — Z3A.33 33 WEEKS GESTATION OF PREGNANCY: ICD-10-CM

## 2024-06-12 DIAGNOSIS — Z03.71 ENCOUNTER FOR SUSPECTED PROBLEM WITH AMNIOTIC CAVITY AND MEMBRANE RULED OUT: ICD-10-CM

## 2024-06-13 ENCOUNTER — NON-APPOINTMENT (OUTPATIENT)
Age: 29
End: 2024-06-13

## 2024-06-18 ENCOUNTER — APPOINTMENT (OUTPATIENT)
Dept: OBGYN | Facility: CLINIC | Age: 29
End: 2024-06-18
Payer: MEDICAID

## 2024-06-18 VITALS — BODY MASS INDEX: 29.81 KG/M2 | WEIGHT: 162 LBS | HEIGHT: 62 IN

## 2024-06-18 DIAGNOSIS — Z3A.34 34 WEEKS GESTATION OF PREGNANCY: ICD-10-CM

## 2024-06-18 PROCEDURE — 0502F SUBSEQUENT PRENATAL CARE: CPT

## 2024-06-19 LAB
APPEARANCE: CLEAR
BILIRUBIN URINE: NEGATIVE
BLOOD URINE: NEGATIVE
COLOR: YELLOW
GLUCOSE QUALITATIVE U: NEGATIVE
KETONES URINE: NEGATIVE
LEUKOCYTE ESTERASE URINE: NEGATIVE
NITRITE URINE: NEGATIVE
PH URINE: 6.5
PROTEIN URINE: NEGATIVE
SPECIFIC GRAVITY URINE: 1.02
UROBILINOGEN URINE: 1 (ref 0.2–?)

## 2024-06-25 ENCOUNTER — ASOB RESULT (OUTPATIENT)
Age: 29
End: 2024-06-25

## 2024-06-25 ENCOUNTER — APPOINTMENT (OUTPATIENT)
Dept: ANTEPARTUM | Facility: CLINIC | Age: 29
End: 2024-06-25
Payer: MEDICAID

## 2024-06-25 DIAGNOSIS — Z34.93 ENCOUNTER FOR SUPERVISION OF NORMAL PREGNANCY, UNSPECIFIED, THIRD TRIMESTER: ICD-10-CM

## 2024-06-25 DIAGNOSIS — Z34.92 ENCOUNTER FOR SUPERVISION OF NORMAL PREGNANCY, UNSPECIFIED, SECOND TRIMESTER: ICD-10-CM

## 2024-06-25 PROCEDURE — 76816 OB US FOLLOW-UP PER FETUS: CPT

## 2024-06-27 ENCOUNTER — NON-APPOINTMENT (OUTPATIENT)
Age: 29
End: 2024-06-27

## 2024-06-27 ENCOUNTER — OUTPATIENT (OUTPATIENT)
Dept: OUTPATIENT SERVICES | Facility: HOSPITAL | Age: 29
LOS: 1 days | End: 2024-06-27
Payer: MEDICAID

## 2024-06-27 VITALS — SYSTOLIC BLOOD PRESSURE: 103 MMHG | HEART RATE: 64 BPM | DIASTOLIC BLOOD PRESSURE: 66 MMHG

## 2024-06-27 VITALS — DIASTOLIC BLOOD PRESSURE: 69 MMHG | SYSTOLIC BLOOD PRESSURE: 113 MMHG | HEART RATE: 68 BPM

## 2024-06-27 DIAGNOSIS — O26.899 OTHER SPECIFIED PREGNANCY RELATED CONDITIONS, UNSPECIFIED TRIMESTER: ICD-10-CM

## 2024-06-27 PROCEDURE — 83986 ASSAY PH BODY FLUID NOS: CPT

## 2024-06-27 PROCEDURE — G0463: CPT

## 2024-06-27 PROCEDURE — 59025 FETAL NON-STRESS TEST: CPT

## 2024-06-28 DIAGNOSIS — Z3A.36 36 WEEKS GESTATION OF PREGNANCY: ICD-10-CM

## 2024-06-28 DIAGNOSIS — R10.30 LOWER ABDOMINAL PAIN, UNSPECIFIED: ICD-10-CM

## 2024-06-28 DIAGNOSIS — D64.9 ANEMIA, UNSPECIFIED: ICD-10-CM

## 2024-06-28 DIAGNOSIS — O99.013 ANEMIA COMPLICATING PREGNANCY, THIRD TRIMESTER: ICD-10-CM

## 2024-06-28 DIAGNOSIS — N89.8 OTHER SPECIFIED NONINFLAMMATORY DISORDERS OF VAGINA: ICD-10-CM

## 2024-06-28 DIAGNOSIS — O26.893 OTHER SPECIFIED PREGNANCY RELATED CONDITIONS, THIRD TRIMESTER: ICD-10-CM

## 2024-06-28 DIAGNOSIS — O99.891 OTHER SPECIFIED DISEASES AND CONDITIONS COMPLICATING PREGNANCY: ICD-10-CM

## 2024-07-01 ENCOUNTER — OUTPATIENT (OUTPATIENT)
Dept: INPATIENT UNIT | Facility: HOSPITAL | Age: 29
LOS: 1 days | End: 2024-07-01
Payer: MEDICAID

## 2024-07-01 VITALS
DIASTOLIC BLOOD PRESSURE: 57 MMHG | SYSTOLIC BLOOD PRESSURE: 98 MMHG | HEART RATE: 67 BPM | TEMPERATURE: 98 F | RESPIRATION RATE: 17 BRPM

## 2024-07-01 VITALS — DIASTOLIC BLOOD PRESSURE: 70 MMHG | SYSTOLIC BLOOD PRESSURE: 120 MMHG | HEART RATE: 71 BPM

## 2024-07-01 DIAGNOSIS — O26.899 OTHER SPECIFIED PREGNANCY RELATED CONDITIONS, UNSPECIFIED TRIMESTER: ICD-10-CM

## 2024-07-01 PROCEDURE — 99213 OFFICE O/P EST LOW 20 MIN: CPT | Mod: GC

## 2024-07-01 PROCEDURE — G0463: CPT

## 2024-07-01 RX ORDER — ACETAMINOPHEN 325 MG
975 TABLET ORAL ONCE
Refills: 0 | Status: COMPLETED | OUTPATIENT
Start: 2024-07-01 | End: 2024-07-01

## 2024-07-01 RX ADMIN — Medication 975 MILLIGRAM(S): at 16:55

## 2024-07-02 ENCOUNTER — APPOINTMENT (OUTPATIENT)
Dept: OBGYN | Facility: CLINIC | Age: 29
End: 2024-07-02
Payer: MEDICAID

## 2024-07-02 ENCOUNTER — NON-APPOINTMENT (OUTPATIENT)
Age: 29
End: 2024-07-02

## 2024-07-02 VITALS
DIASTOLIC BLOOD PRESSURE: 82 MMHG | HEIGHT: 62 IN | SYSTOLIC BLOOD PRESSURE: 124 MMHG | BODY MASS INDEX: 30.27 KG/M2 | WEIGHT: 164.5 LBS

## 2024-07-02 DIAGNOSIS — Z3A.36 36 WEEKS GESTATION OF PREGNANCY: ICD-10-CM

## 2024-07-02 PROCEDURE — 0502F SUBSEQUENT PRENATAL CARE: CPT

## 2024-07-04 DIAGNOSIS — E66.9 OBESITY, UNSPECIFIED: ICD-10-CM

## 2024-07-04 DIAGNOSIS — O99.343 OTHER MENTAL DISORDERS COMPLICATING PREGNANCY, THIRD TRIMESTER: ICD-10-CM

## 2024-07-04 DIAGNOSIS — F32.A DEPRESSION, UNSPECIFIED: ICD-10-CM

## 2024-07-04 DIAGNOSIS — Z3A.36 36 WEEKS GESTATION OF PREGNANCY: ICD-10-CM

## 2024-07-04 DIAGNOSIS — O26.893 OTHER SPECIFIED PREGNANCY RELATED CONDITIONS, THIRD TRIMESTER: ICD-10-CM

## 2024-07-04 DIAGNOSIS — O99.013 ANEMIA COMPLICATING PREGNANCY, THIRD TRIMESTER: ICD-10-CM

## 2024-07-04 DIAGNOSIS — R10.31 RIGHT LOWER QUADRANT PAIN: ICD-10-CM

## 2024-07-04 DIAGNOSIS — D64.9 ANEMIA, UNSPECIFIED: ICD-10-CM

## 2024-07-04 DIAGNOSIS — O21.2 LATE VOMITING OF PREGNANCY: ICD-10-CM

## 2024-07-04 DIAGNOSIS — O99.213 OBESITY COMPLICATING PREGNANCY, THIRD TRIMESTER: ICD-10-CM

## 2024-07-08 ENCOUNTER — NON-APPOINTMENT (OUTPATIENT)
Age: 29
End: 2024-07-08

## 2024-07-11 ENCOUNTER — APPOINTMENT (OUTPATIENT)
Dept: OBGYN | Facility: CLINIC | Age: 29
End: 2024-07-11
Payer: MEDICAID

## 2024-07-11 VITALS
DIASTOLIC BLOOD PRESSURE: 70 MMHG | SYSTOLIC BLOOD PRESSURE: 110 MMHG | WEIGHT: 172 LBS | BODY MASS INDEX: 31.65 KG/M2 | HEIGHT: 62 IN

## 2024-07-11 DIAGNOSIS — Z3A.38 38 WEEKS GESTATION OF PREGNANCY: ICD-10-CM

## 2024-07-11 LAB
APPEARANCE: CLEAR
BILIRUBIN URINE: NEGATIVE
BLOOD URINE: NEGATIVE
COLOR: YELLOW
GLUCOSE QUALITATIVE U: NEGATIVE
KETONES URINE: ABNORMAL
LEUKOCYTE ESTERASE URINE: NEGATIVE
NITRITE URINE: NEGATIVE
PH URINE: 6.5
PROTEIN URINE: NEGATIVE
UROBILINOGEN URINE: 0.2 (ref 0.2–?)

## 2024-07-11 PROCEDURE — 0502F SUBSEQUENT PRENATAL CARE: CPT

## 2024-07-15 ENCOUNTER — NON-APPOINTMENT (OUTPATIENT)
Age: 29
End: 2024-07-15

## 2024-07-16 ENCOUNTER — OUTPATIENT (OUTPATIENT)
Dept: OUTPATIENT SERVICES | Facility: HOSPITAL | Age: 29
LOS: 1 days | End: 2024-07-16
Payer: MEDICAID

## 2024-07-16 VITALS — HEART RATE: 71 BPM | DIASTOLIC BLOOD PRESSURE: 68 MMHG | SYSTOLIC BLOOD PRESSURE: 112 MMHG

## 2024-07-16 VITALS — SYSTOLIC BLOOD PRESSURE: 121 MMHG | DIASTOLIC BLOOD PRESSURE: 79 MMHG | HEART RATE: 68 BPM

## 2024-07-16 DIAGNOSIS — O26.899 OTHER SPECIFIED PREGNANCY RELATED CONDITIONS, UNSPECIFIED TRIMESTER: ICD-10-CM

## 2024-07-16 PROCEDURE — 59025 FETAL NON-STRESS TEST: CPT

## 2024-07-16 PROCEDURE — 99283 EMERGENCY DEPT VISIT LOW MDM: CPT

## 2024-07-16 PROCEDURE — G0463: CPT

## 2024-07-16 NOTE — OB PROVIDER TRIAGE NOTE - NSOBPROVIDERNOTE_OBGYN_ALL_OB_FT
A/P:  29y GP at [id number] GA evaluated in early labor.    - pt in early labor, contractions infrequent  - desires to continue laboring at home  - counseled to return if contractions increase in frequency/intensity, if she breaks her water, has heavy vaginal bleeding, or experiences decreased fetal movement    discussed with Dr. Morgan

## 2024-07-16 NOTE — OB PROVIDER TRIAGE NOTE - HISTORY OF PRESENT ILLNESS
GALILEO GRIDER is a 29y GP at [id number] GA who presents to L&D for 3-day hx of mucoid discharge and contractions. Pt endorses persistent contractions occurring around 6am this morning, progressively worsening to a 7/10 menstruation-like pain. Pain is not position dependent. Pt had an episode of vomiting 3PM. 3 days ago, patient saw mucoidy discharge with dark blood mixed in, unassociated with pain. No fevers, chills, chest pain. Endorses some SOB at rest.    Pt denies vaginal bleeding and leakage of fluid. She endorses good fetal movement.   Pt denies trauma. Her last cervical exam was 2023, unremarkable. Last sexual intercourse was .   Pt denies headaches, visual disturbances, RUQ pain, epigastric pain and new-onset edema.     She denies any urinary complaints.   Pregnancy course is  uncomplicated.       POB:  in 2018, associated with chlamydial infection s/p abx  PGYN: -fibroids/-cysts, denied STD hx, denies abnormal PAPs  PMH: none  PSH: none  SH: Denies tobacco use, EtOH use and illicit drug use during the pregnancy; Feels safe at home  Meds: prenatal vitamin  All:    T(C): 36.8 (24 @ 19:37), Max: 36.8 (24 @ 19:37)  HR: 68 (24 @ 20:05) (68 - 71)  BP: 121/79 (24 @ 20:05) (112/68 - 121/79)  RR: 16 (24 @ 19:37) (16 - 16)  SpO2: --  Gen: NAD, well-appearing  Heart: normal S1 S2, RRR  Lungs: CTAB  Abd: tenderness in suprapubic region, soft, gravid  Ext: non-edematous, non-tender   SVE: 2.5 / 40 -3  SSE: cervix visualized, closed and without any signs of bleeding or drainage, no pooling     A/P:  29y GP at [id number] GA who presents to L&D for 3-day hx of mucoid discharge and suprapubic tightness, found to have suprapubic tenderness and cervical dilation 2cm eff 40, consistent with active contractions and clinically stable. Pt has been advised to monitor for rupture of membrane at home with active contractions, should this occur patient should come back to hospital for .    #suprapubic tightness: Expectant management for .   Dispo: Continue to observe.     Discussed with    GALILEO GRIDER is a 29y GP at [id number] GA who presents to L&D c/o contractions since early this morning q15m. Denies loss of fluid, vaginal bleeding. Endorses normal fetal movement. States she was examined in the office last week and was minimally dilated. She does not want anything for the pain right now. She lives 7 minutes from the hospital. Her next prenatal appointment is this Thursday.    BRIDGET: 22 by LMP 10/19/23    Pregnancy course is  uncomplicated.     POB:  in 2018, associated with chlamydial infection s/p abx  PGYN: -fibroids/-cysts, denied STD hx, denies abnormal PAPs  PMH: none  PSH: none  SH: Denies tobacco use, EtOH use and illicit drug use during the pregnancy; Feels safe at home  Meds: prenatal vitamin  All: NKDA    24: vtx, posterior placenta, EFW 2841g 60%ile

## 2024-07-16 NOTE — OB RN TRIAGE NOTE - FALL HARM RISK - UNIVERSAL INTERVENTIONS
Bed in lowest position, wheels locked, appropriate side rails in place/Call bell, personal items and telephone in reach/Instruct patient to call for assistance before getting out of bed or chair/Non-slip footwear when patient is out of bed/[place] to call system/Physically safe environment - no spills, clutter or unnecessary equipment/Purposeful Proactive Rounding/Room/bathroom lighting operational, light cord in reach

## 2024-07-16 NOTE — OB PROVIDER TRIAGE NOTE - ATTENDING COMMENTS
29y GP at [id number] GA who presents to L&D c/o contractions since early this morning q15m. Denies loss of fluid, vaginal bleeding. Endorses normal fetal movement. States she was examined in the office last week and was minimally dilated. She does not want anything for the pain right now. She lives 7 minutes from the hospital. Her next prenatal appointment is this Thursday.    BRIDGET: 7/25/24 by LMP 10/19/23    Pregnancy course is  uncomplicated.    Gen: NAD, well-appearing  Neuro: A&Ox3   Heart: normal S1 S2, RRR  Lungs: CTAB  Abd: nontender, gravid  Ext: non-edematous, non-tender   SVE: 2.5/ 40/-3  SSE: cervix visualized, closed and without any signs of bleeding or drainage, no pooling    FHT: moderate variability, +accels, -decels  [place]: q20m      29y GP at [id number] GA evaluated in early labor.    - pt in early labor, contractions infrequent  - desires to continue laboring at home  - counseled to return if contractions increase in frequency/intensity, if she breaks her water, has heavy vaginal bleeding, or experiences decreased fetal movement

## 2024-07-16 NOTE — OB PROVIDER TRIAGE NOTE - NSHPPHYSICALEXAM_GEN_ALL_CORE
T(C): 36.8 (07-16-24 @ 19:37), Max: 36.8 (07-16-24 @ 19:37)  HR: 68 (07-16-24 @ 20:05) (68 - 71)  BP: 121/79 (07-16-24 @ 20:05) (112/68 - 121/79)  RR: 16 (07-16-24 @ 19:37) (16 - 16)    Gen: NAD, well-appearing  Neuro: A&Ox3   Heart: normal S1 S2, RRR  Lungs: CTAB  Abd: nontender, gravid  Ext: non-edematous, non-tender   SVE: 2.5/ 40/-3  SSE: cervix visualized, closed and without any signs of bleeding or drainage, no pooling    FHT: moderate variability, +accels, -decels  [place]: q20m

## 2024-07-16 NOTE — OB RN TRIAGE NOTE - NS_FETALHEARTRATE_OBGYN_ALL_OB_FT
EMERGENCY DEPARTMENT ENCOUNTER      This patient was seen and evaluated by the attending physician. Pt Name: Marylene Corns  MRN: [id number]  [place] 1965  Date of evaluation: 3/11/2021  Provider: MAT Emerson  PCP: No primary care provider on file. ED Attending: Dr. Lady Brumfield    History provided by the patient    CHIEF COMPLAINT:     Chief Complaint   Patient presents with    Chest Pain     per squad report patient patient c/o intermittent chest pain all day. squad administered 324mg aspirin and 3 nitro in route to ED        HISTORY OF PRESENT ILLNESS:      Marylene Corns is a 60 y.o. male who presents to [address] [place] [address] ER with complaints of chest pain. Patient states that he has had chest pain starting this morning, has been intermittent. Patient denies any cardiac history and denies any frequent chest pain. Patient does have significant pvd and has had stents in the past. No coronary stents, no recent cardiac evaluation. He is here for further evaluation. Location:chest  [facility] [place]  Severity:6  Duration:today  Modifying factors:none noted    Nursing Notes were reviewed     REVIEW OF SYSTEMS:     Review of Systems  All systems, atotal of 10, are reviewed and negative except for those that were just noted in history present illness.         PAST MEDICAL HISTORY:     Past Medical History:   Diagnosis Date    Bowel incontinence     Hyperlipidemia     Hypertension     PAD (peripheral artery disease) (HCC)     Poor vision     left eye    Pre-diabetes     Sleep apnea     NO CPAP    Stroke ([facility] [address] 2016    x3 - memory deficit    Wears partial dentures     upper & lower         SURGICAL HISTORY:      Past Surgical History:   Procedure Laterality Date    ABDOMEN SURGERY      colon polyps    FEMORAL-TIBIAL BYPASS GRAFT Right 6/24/2020    RIGHT FEMORAL TO PERONEAL  BYPASS GRAFT performed by Fredis Jang MD at [place] [address] MEDICATIONS:       Current Discharge Medication List      CONTINUE these medications which have NOT CHANGED    Details   aspirin 325 MG tablet Take 325 mg by mouth daily 6/22/20 - has not taken for over one month (\"ran out\")      clopidogrel (PLAVIX) 75 MG tablet Take 75 mg by mouth daily       atorvastatin (LIPITOR) 80 MG tablet Take 80 mg by mouth daily       gabapentin (NEURONTIN) 300 MG capsule Take 300 mg by mouth 2 times daily. Takes 600mg in AM, 300mg HS. lisinopril (PRINIVIL;ZESTRIL) 10 MG tablet Take 10 mg by mouth daily                ALLERGIES:    Patient has no known allergies.     FAMILY HISTORY:       Family History   Problem Relation Age of Onset    Diabetes Mother           SOCIAL HISTORY:       Social History     Socioeconomic History    Marital status:      Spouse name: None    Number of children: None    Years of education: None    Highest education level: None   Occupational History    None   Social Needs    Financial resource strain: None    Food insecurity     Worry: None     Inability: None    Transportation needs     Medical: None     Non-medical: None   Tobacco Use    Smoking status: Current Every Day Smoker     Packs/day: 1.00     Years: 40.00     Pack years: 40.00     Types: Cigarettes    Smokeless tobacco: Never Used   Substance and Sexual Activity    Alcohol use: Not Currently    Drug use: No    Sexual activity: Yes     Partners: Female   Lifestyle    Physical activity     Days per week: None     Minutes per session: None    Stress: None   Relationships    Social connections     Talks on phone: None     Gets together: None     Attends Sabianist service: None     Active member of club or organization: None     Attends meetings of clubs or organizations: None     Relationship status: None    Intimate partner violence     Fear of current or ex partner: None     Emotionally abused: None     Physically abused: None     Forced sexual activity: None   Other Topics judgement. Alert andoriented x 3.         DIAGNOSTIC RESULTS:     LABS:    Results for orders placed or performed during the hospital encounter of 03/11/21   CBC auto differential   Result Value Ref Range    WBC 7.7 4.0 - 11.0 K/uL    RBC 3.98 (L) 4.20 - 5.90 M/uL    Hemoglobin 13.8 13.5 - 17.5 g/dL    Hematocrit 38.7 (L) 40.5 - 52.5 %    MCV 97.3 80.0 - 100.0 fL    MCH 34.6 (H) 26.0 - 34.0 pg    MCHC 35.6 31.0 - 36.0 g/dL    RDW 14.1 12.4 - 15.4 %    Platelets 139 697 - 893 K/uL    MPV 6.2 5.0 - 10.5 fL    Neutrophils % 61.6 %    Lymphocytes % 27.7 %    Monocytes % 5.5 %    Eosinophils % 3.9 %    Basophils % 1.3 %    Neutrophils Absolute 4.8 1.7 - 7.7 K/uL    Lymphocytes Absolute 2.1 1.0 - 5.1 K/uL    Monocytes Absolute 0.4 0.0 - 1.3 K/uL    Eosinophils Absolute 0.3 0.0 - 0.6 K/uL    Basophils Absolute 0.1 0.0 - 0.2 K/uL   Comprehensive metabolic panel   Result Value Ref Range    Sodium 143 136 - 145 mmol/L    Potassium 3.9 3.5 - 5.1 mmol/L    Chloride 108 99 - 110 mmol/L    CO2 26 21 - 32 mmol/L    Anion Gap 9 3 - 16    Glucose 112 (H) 70 - 99 mg/dL    BUN 14 7 - 20 mg/dL    CREATININE 0.9 0.9 - 1.3 mg/dL    GFR Non-African American >60 >60    GFR African American >60 >60    Calcium 9.0 8.3 - 10.6 mg/dL    Total Protein 6.4 6.4 - 8.2 g/dL    Albumin 4.1 3.4 - 5.0 g/dL    Albumin/Globulin Ratio 1.8 1.1 - 2.2    Total Bilirubin <0.2 0.0 - 1.0 mg/dL    Alkaline Phosphatase 111 40 - 129 U/L    ALT 10 10 - 40 U/L    AST 12 (L) 15 - 37 U/L    Globulin 2.3 g/dL   Troponin   Result Value Ref Range    Troponin <0.01 <0.01 ng/mL   Sample possible blood bank testing   Result Value Ref Range    Specimen Status FLORENCIO    Troponin   Result Value Ref Range    Troponin <0.01 <0.01 ng/mL   EKG 12 Lead   Result Value Ref Range    Ventricular Rate 70 BPM    Atrial Rate 70 BPM    P-R Interval 140 ms    QRS Duration 90 ms    Q-T Interval 394 ms    QTc Calculation (Bazett) 425 ms    P Axis 48 degrees    R Axis 64 degrees    T Axis 48 degrees    Diagnosis       Normal sinus rhythmNormal ECGWhen compared with ECG of 17-AUG-2020 17:55,No significant change was found         RADIOLOGY:  All x-ray studies are viewed/reviewedby me. Formal interpretations per the radiologist are as follows:      XR CHEST (2 VW)   Final Result   No acute cardiopulmonary findings. NM MYOCARDIAL SPECT REST EXERCISE OR RX    (Results Pending)           EKG:  See EKG interpretation by an attending [id number]:   N/A    CRITICAL CARE TIME:   N/A    CONSULTS:  IP CONSULT TO HOSPITALIST      EMERGENCYDEPARTMENT COURSE and DIFFERENTIAL DIAGNOSIS/MDM:   Vitals:    Vitals:    03/11/21 1827 03/11/21 1858 03/11/21 2008 03/11/21 2035   BP: 123/75 134/80 125/75 (!) 153/80   Pulse: 68 70 66 66   Resp: 18 23  16   Temp:    98 °F (36.7 °C)   TempSrc:    Oral   SpO2: 96% 96% 96% 98%   Weight:    182 lb 6.4 oz (82.7 kg)   Height:               Patient was evaluated by both myself and Dr. Catie Shankar   patient presented to the ER with complaints of chest pain, patient is a vasculopath with significant pvd. Patient has not had cardiac workup. He has hx of hypertension, hyperlipidemia, is a smoker and has obvious vascular problems. Patient trop negative and ekg was non ischemic. Labs were generally unremarkable. Given his significant risk factors I feel that patient would benefit from admission for cardiac workup. Patient was admitted in stable condition    Patient laboratory studies, radiographic imaging, andassessment were all discussed with the patient and/or patient family. There was shared decision-making between myself, the attending physician, as well as the patient and/or their surrogate and we are all in agreement with admission. There was an opportunity for questions and all questions were answered to the best of my ability and to the satisfaction of the patient and/or patient family. FINAL IMPRESSION:      1.  Chest pain, unspecified type DISPOSITION/PLAN:   DISPOSITIONAdmitted      PATIENT REFERRED TO:  No follow-up provider specified.     DISCHARGE MEDICATIONS:  Current Discharge Medication List                     (Please note that portions of this note were completed with a voice recognition program.  Efforts were made to edit the dictations, but occasionally words are mis-transcribed.)    MAT Gomez CNP-C (electronically signed)        MAT Gomez CNP  03/11/21 [id number]

## 2024-07-17 ENCOUNTER — TRANSCRIPTION ENCOUNTER (OUTPATIENT)
Age: 29
End: 2024-07-17

## 2024-07-17 ENCOUNTER — INPATIENT (INPATIENT)
Facility: HOSPITAL | Age: 29
LOS: 0 days | Discharge: HOME | DRG: 833 | End: 2024-07-18
Attending: OBSTETRICS & GYNECOLOGY | Admitting: OBSTETRICS & GYNECOLOGY
Payer: MEDICAID

## 2024-07-17 VITALS
SYSTOLIC BLOOD PRESSURE: 140 MMHG | RESPIRATION RATE: 17 BRPM | DIASTOLIC BLOOD PRESSURE: 82 MMHG | HEART RATE: 82 BPM | TEMPERATURE: 98 F

## 2024-07-17 DIAGNOSIS — O26.893 OTHER SPECIFIED PREGNANCY RELATED CONDITIONS, THIRD TRIMESTER: ICD-10-CM

## 2024-07-17 DIAGNOSIS — O26.899 OTHER SPECIFIED PREGNANCY RELATED CONDITIONS, UNSPECIFIED TRIMESTER: ICD-10-CM

## 2024-07-17 LAB
ALBUMIN SERPL ELPH-MCNC: 3.3 G/DL — SIGNIFICANT CHANGE UP (ref 3.3–5.2)
ALP SERPL-CCNC: 220 U/L — HIGH (ref 40–120)
ALT FLD-CCNC: 10 U/L — SIGNIFICANT CHANGE UP
ANION GAP SERPL CALC-SCNC: 15 MMOL/L — SIGNIFICANT CHANGE UP (ref 5–17)
APPEARANCE UR: CLEAR — SIGNIFICANT CHANGE UP
APTT BLD: 25.9 SEC — SIGNIFICANT CHANGE UP (ref 24.5–35.6)
AST SERPL-CCNC: 23 U/L — SIGNIFICANT CHANGE UP
BACTERIA # UR AUTO: ABNORMAL /HPF
BASOPHILS # BLD AUTO: 0.02 K/UL — SIGNIFICANT CHANGE UP (ref 0–0.2)
BASOPHILS NFR BLD AUTO: 0.2 % — SIGNIFICANT CHANGE UP (ref 0–2)
BILIRUB SERPL-MCNC: <0.2 MG/DL — LOW (ref 0.4–2)
BILIRUB UR-MCNC: NEGATIVE — SIGNIFICANT CHANGE UP
BLD GP AB SCN SERPL QL: SIGNIFICANT CHANGE UP
BUN SERPL-MCNC: 11.4 MG/DL — SIGNIFICANT CHANGE UP (ref 8–20)
CALCIUM SERPL-MCNC: 9.3 MG/DL — SIGNIFICANT CHANGE UP (ref 8.4–10.5)
CAST: 1 /LPF — SIGNIFICANT CHANGE UP (ref 0–4)
CHLORIDE SERPL-SCNC: 100 MMOL/L — SIGNIFICANT CHANGE UP (ref 96–108)
CO2 SERPL-SCNC: 19 MMOL/L — LOW (ref 22–29)
COLOR SPEC: YELLOW — SIGNIFICANT CHANGE UP
CREAT ?TM UR-MCNC: 42 MG/DL — SIGNIFICANT CHANGE UP
CREAT SERPL-MCNC: 0.49 MG/DL — LOW (ref 0.5–1.3)
DIFF PNL FLD: ABNORMAL
EGFR: 129 ML/MIN/1.73M2 — SIGNIFICANT CHANGE UP
EOSINOPHIL # BLD AUTO: 0.19 K/UL — SIGNIFICANT CHANGE UP (ref 0–0.5)
EOSINOPHIL NFR BLD AUTO: 2.3 % — SIGNIFICANT CHANGE UP (ref 0–6)
FIBRINOGEN PPP-MCNC: 571 MG/DL — HIGH (ref 200–450)
GLUCOSE SERPL-MCNC: 86 MG/DL — SIGNIFICANT CHANGE UP (ref 70–99)
GLUCOSE UR QL: NEGATIVE MG/DL — SIGNIFICANT CHANGE UP
HCT VFR BLD CALC: 35.1 % — SIGNIFICANT CHANGE UP (ref 34.5–45)
HGB BLD-MCNC: 11.7 G/DL — SIGNIFICANT CHANGE UP (ref 11.5–15.5)
IMM GRANULOCYTES NFR BLD AUTO: 1 % — HIGH (ref 0–0.9)
INR BLD: 0.93 RATIO — SIGNIFICANT CHANGE UP (ref 0.85–1.18)
KETONES UR-MCNC: NEGATIVE MG/DL — SIGNIFICANT CHANGE UP
LDH SERPL L TO P-CCNC: 203 U/L — HIGH (ref 98–192)
LEUKOCYTE ESTERASE UR-ACNC: ABNORMAL
LYMPHOCYTES # BLD AUTO: 2.09 K/UL — SIGNIFICANT CHANGE UP (ref 1–3.3)
LYMPHOCYTES # BLD AUTO: 25.2 % — SIGNIFICANT CHANGE UP (ref 13–44)
MCHC RBC-ENTMCNC: 29.8 PG — SIGNIFICANT CHANGE UP (ref 27–34)
MCHC RBC-ENTMCNC: 33.3 GM/DL — SIGNIFICANT CHANGE UP (ref 32–36)
MCV RBC AUTO: 89.3 FL — SIGNIFICANT CHANGE UP (ref 80–100)
MONOCYTES # BLD AUTO: 0.53 K/UL — SIGNIFICANT CHANGE UP (ref 0–0.9)
MONOCYTES NFR BLD AUTO: 6.4 % — SIGNIFICANT CHANGE UP (ref 2–14)
NEUTROPHILS # BLD AUTO: 5.37 K/UL — SIGNIFICANT CHANGE UP (ref 1.8–7.4)
NEUTROPHILS NFR BLD AUTO: 64.9 % — SIGNIFICANT CHANGE UP (ref 43–77)
NITRITE UR-MCNC: NEGATIVE — SIGNIFICANT CHANGE UP
PH UR: 7.5 — SIGNIFICANT CHANGE UP (ref 5–8)
PLATELET # BLD AUTO: 173 K/UL — SIGNIFICANT CHANGE UP (ref 150–400)
POTASSIUM SERPL-MCNC: 4 MMOL/L — SIGNIFICANT CHANGE UP (ref 3.5–5.3)
POTASSIUM SERPL-SCNC: 4 MMOL/L — SIGNIFICANT CHANGE UP (ref 3.5–5.3)
PROT ?TM UR-MCNC: 8 MG/DL — SIGNIFICANT CHANGE UP (ref 0–12)
PROT SERPL-MCNC: 6.5 G/DL — LOW (ref 6.6–8.7)
PROT UR-MCNC: NEGATIVE MG/DL — SIGNIFICANT CHANGE UP
PROT/CREAT UR-RTO: 0.2 RATIO — SIGNIFICANT CHANGE UP
PROTHROM AB SERPL-ACNC: 10.3 SEC — SIGNIFICANT CHANGE UP (ref 9.5–13)
RBC # BLD: 3.93 M/UL — SIGNIFICANT CHANGE UP (ref 3.8–5.2)
RBC # FLD: 13.2 % — SIGNIFICANT CHANGE UP (ref 10.3–14.5)
RBC CASTS # UR COMP ASSIST: 48 /HPF — HIGH (ref 0–4)
SODIUM SERPL-SCNC: 134 MMOL/L — LOW (ref 135–145)
SP GR SPEC: 1.01 — SIGNIFICANT CHANGE UP (ref 1–1.03)
SQUAMOUS # UR AUTO: 4 /HPF — SIGNIFICANT CHANGE UP (ref 0–5)
T PALLIDUM AB TITR SER: NEGATIVE — SIGNIFICANT CHANGE UP
URATE SERPL-MCNC: 2.9 MG/DL — SIGNIFICANT CHANGE UP (ref 2.4–5.7)
UROBILINOGEN FLD QL: 1 MG/DL — SIGNIFICANT CHANGE UP (ref 0.2–1)
WBC # BLD: 8.28 K/UL — SIGNIFICANT CHANGE UP (ref 3.8–10.5)
WBC # FLD AUTO: 8.28 K/UL — SIGNIFICANT CHANGE UP (ref 3.8–10.5)
WBC UR QL: 5 /HPF — SIGNIFICANT CHANGE UP (ref 0–5)

## 2024-07-17 PROCEDURE — 59400 OBSTETRICAL CARE: CPT | Mod: U9

## 2024-07-17 RX ORDER — TRISODIUM CITRATE DIHYDRATE AND CITRIC ACID MONOHYDRATE 500; 334 MG/5ML; MG/5ML
30 SOLUTION ORAL ONCE
Refills: 0 | Status: DISCONTINUED | OUTPATIENT
Start: 2024-07-17 | End: 2024-07-17

## 2024-07-17 RX ORDER — HYDROCORTISONE ACETATE 1 %
1 OINTMENT (GRAM) RECTAL EVERY 4 HOURS
Refills: 0 | Status: DISCONTINUED | OUTPATIENT
Start: 2024-07-17 | End: 2024-07-18

## 2024-07-17 RX ORDER — BENZOCAINE 15 %
1 LIQUID (ML) TOPICAL EVERY 6 HOURS
Refills: 0 | Status: DISCONTINUED | OUTPATIENT
Start: 2024-07-17 | End: 2024-07-18

## 2024-07-17 RX ORDER — OXYTOCIN 30 [USP'U]/500ML
INJECTION, SOLUTION INTRAVENOUS
Qty: 30 | Refills: 0 | Status: DISCONTINUED | OUTPATIENT
Start: 2024-07-17 | End: 2024-07-17

## 2024-07-17 RX ORDER — PRAMOXINE HCL 1 %
1 CREAM (GRAM) RECTAL EVERY 4 HOURS
Refills: 0 | Status: DISCONTINUED | OUTPATIENT
Start: 2024-07-17 | End: 2024-07-18

## 2024-07-17 RX ORDER — OXYTOCIN 30 [USP'U]/500ML
333.33 INJECTION, SOLUTION INTRAVENOUS
Qty: 20 | Refills: 0 | Status: COMPLETED | OUTPATIENT
Start: 2024-07-17 | End: 2024-07-17

## 2024-07-17 RX ORDER — SIMETHICONE 40MG/0.6ML
80 SUSPENSION, DROPS(FINAL DOSAGE FORM)(ML) ORAL EVERY 4 HOURS
Refills: 0 | Status: DISCONTINUED | OUTPATIENT
Start: 2024-07-17 | End: 2024-07-18

## 2024-07-17 RX ORDER — OXYCODONE HYDROCHLORIDE 100 MG/5ML
5 SOLUTION ORAL
Refills: 0 | Status: DISCONTINUED | OUTPATIENT
Start: 2024-07-17 | End: 2024-07-18

## 2024-07-17 RX ORDER — PRENATAL VIT/IRON FUM/FOLIC AC 60 MG-1 MG
1 TABLET ORAL DAILY
Refills: 0 | Status: DISCONTINUED | OUTPATIENT
Start: 2024-07-17 | End: 2024-07-18

## 2024-07-17 RX ORDER — DIPHENHYDRAMINE HCL 12.5MG/5ML
25 ELIXIR ORAL EVERY 6 HOURS
Refills: 0 | Status: DISCONTINUED | OUTPATIENT
Start: 2024-07-17 | End: 2024-07-18

## 2024-07-17 RX ORDER — TETANUS TOXOID, REDUCED DIPHTHERIA TOXOID AND ACELLULAR PERTUSSIS VACCINE, ADSORBED 5; 2.5; 8; 8; 2.5 [IU]/.5ML; [IU]/.5ML; UG/.5ML; UG/.5ML; UG/.5ML
0.5 SUSPENSION INTRAMUSCULAR ONCE
Refills: 0 | Status: DISCONTINUED | OUTPATIENT
Start: 2024-07-17 | End: 2024-07-18

## 2024-07-17 RX ORDER — HYDROCORTISONE VALERATE 0.2 %
1 CREAM (GRAM) TOPICAL EVERY 6 HOURS
Refills: 0 | Status: DISCONTINUED | OUTPATIENT
Start: 2024-07-17 | End: 2024-07-18

## 2024-07-17 RX ORDER — SODIUM CHLORIDE 0.9 % (FLUSH) 0.9 %
3 SYRINGE (ML) INJECTION EVERY 8 HOURS
Refills: 0 | Status: DISCONTINUED | OUTPATIENT
Start: 2024-07-17 | End: 2024-07-18

## 2024-07-17 RX ORDER — OXYTOCIN 30 [USP'U]/500ML
41.67 INJECTION, SOLUTION INTRAVENOUS
Qty: 20 | Refills: 0 | Status: DISCONTINUED | OUTPATIENT
Start: 2024-07-17 | End: 2024-07-18

## 2024-07-17 RX ORDER — OXYCODONE HYDROCHLORIDE 100 MG/5ML
5 SOLUTION ORAL ONCE
Refills: 0 | Status: DISCONTINUED | OUTPATIENT
Start: 2024-07-17 | End: 2024-07-18

## 2024-07-17 RX ORDER — ACETAMINOPHEN 325 MG
975 TABLET ORAL
Refills: 0 | Status: DISCONTINUED | OUTPATIENT
Start: 2024-07-17 | End: 2024-07-18

## 2024-07-17 RX ORDER — KETOROLAC TROMETHAMINE 30 MG/ML
30 INJECTION, SOLUTION INTRAMUSCULAR ONCE
Refills: 0 | Status: DISCONTINUED | OUTPATIENT
Start: 2024-07-17 | End: 2024-07-17

## 2024-07-17 RX ORDER — LANOLIN
1 WAX (GRAM) MISCELLANEOUS EVERY 6 HOURS
Refills: 0 | Status: DISCONTINUED | OUTPATIENT
Start: 2024-07-17 | End: 2024-07-18

## 2024-07-17 RX ORDER — DEXTROSE MONOHYDRATE AND SODIUM CHLORIDE 5; .3 G/100ML; G/100ML
1000 INJECTION, SOLUTION INTRAVENOUS
Refills: 0 | Status: DISCONTINUED | OUTPATIENT
Start: 2024-07-17 | End: 2024-07-17

## 2024-07-17 RX ORDER — DIBUCAINE 1 %
1 OINTMENT (GRAM) TOPICAL EVERY 6 HOURS
Refills: 0 | Status: DISCONTINUED | OUTPATIENT
Start: 2024-07-17 | End: 2024-07-18

## 2024-07-17 RX ADMIN — OXYTOCIN 2 MILLIUNIT(S)/MIN: 30 INJECTION, SOLUTION INTRAVENOUS at 12:01

## 2024-07-17 RX ADMIN — KETOROLAC TROMETHAMINE 30 MILLIGRAM(S): 30 INJECTION, SOLUTION INTRAMUSCULAR at 17:27

## 2024-07-17 RX ADMIN — OXYTOCIN 1000 MILLIUNIT(S)/MIN: 30 INJECTION, SOLUTION INTRAVENOUS at 17:28

## 2024-07-17 RX ADMIN — Medication 975 MILLIGRAM(S): at 21:44

## 2024-07-17 RX ADMIN — Medication 975 MILLIGRAM(S): at 21:46

## 2024-07-17 NOTE — PROGRESS NOTE ADULT - ASSESSMENT
29y  @ 38w6d is admitted to L&D for labor.    A/P:   -Admit to L&D  -Consent  -Admission labs  -IV fluids  -Fetus: Cat I tracing. Continuous toco and fetal monitoring.   -GBS: Negative, no GBS ppx required   -Analgesia: epidural prn    Dr Corey notified          Dr Morgan

## 2024-07-17 NOTE — OB NEONATOLOGY/PEDIATRICIAN DELIVERY SUMMARY - NS_FINALEDD_OBGYN_ALL_OB_DT
Form completed and sent to Physician's Office electronically via Nurse Pool for review and signature.     Completed form will be faxed to [phone]   25-Jul-2024

## 2024-07-17 NOTE — OB RN TRIAGE NOTE - ABORTIONS, OB PROFILE
PREOPERATIVE DAY OF PROCEDURE EVALUATION:  I have personally seen and examined the patient.  I agree with the history and physical which I have reviewed and noted any changes below.  (Signed electronically by __________)  12-12-18 @ 10:17 0

## 2024-07-17 NOTE — OB PROVIDER H&P - HISTORY OF PRESENT ILLNESS
29y  @ 38w6d is admitted to L&D for labor. The patient reports a leakage of fluid at 1050pm this evening. She is feeling painful contractions every 8-9 minutes. She was previously examined in triage earlier this evening with complaints of contractions. At this time she was 2.5/40/-3 and sent home with return precautions.     BRIDGET: 24 by LMP 10/19/23    Pregnancy course is  uncomplicated.     POB:  in 2018, associated with chlamydial infection s/p abx  PGYN: -fibroids/-cysts, denied STD hx, denies abnormal PAPs  PMH: none  PSH: none  SH: Denies tobacco use, EtOH use and illicit drug use during the pregnancy; Feels safe at home  Meds: prenatal vitamin  All: NKDA    24: vtx, posterior placenta, EFW 2841g 60%ile ·	29y  @ 38w6d is admitted to L&D for labor. The patient reports a leakage of fluid at 1050pm this evening. She is feeling painful contractions every 8-9 minutes. She was previously examined in triage earlier this evening with complaints of contractions. At this time she was 2.5/40/-3 and sent home with return precautions.   ·	  ·	BRIDGET: 24 by LMP 10/19/23  ·	  ·	Pregnancy course is  uncomplicated.   ·	  ·	POB:  in 2018, associated with chlamydial infection s/p abx  ·	PGYN: -fibroids/-cysts, denied STD hx, denies abnormal PAPs  ·	PMH: none  ·	PSH: none  ·	SH: Denies tobacco use, EtOH use and illicit drug use during the pregnancy; Feels safe at home  ·	Meds: prenatal vitamin  ·	All: NKDA  ·	  ·	24: vtx, posterior placenta, EFW 2841g 60%ile

## 2024-07-17 NOTE — OB PROVIDER LABOR PROGRESS NOTE - NS_SUBJECTIVE/OBJECTIVE_OBGYN_ALL_OB_FT
pt seen and examined- no complaints
patient is feeling pelvic pressure     Vital Signs Last 24 Hrs  T(C): 36.9 (17 Jul 2024 00:28), Max: 36.9 (17 Jul 2024 00:28)  T(F): 98.4 (17 Jul 2024 00:28), Max: 98.4 (17 Jul 2024 00:28)  HR: 82 (17 Jul 2024 00:28) (68 - 82)  BP: 140/82 (17 Jul 2024 00:28) (112/68 - 140/82)  RR: 17 (17 Jul 2024 00:28) (16 - 17)      Parameters below as of 17 Jul 2024 00:28  Patient On (Oxygen Delivery Method): room air

## 2024-07-17 NOTE — OB PROVIDER H&P - ATTENDING COMMENTS
29y  @ 38w6d is admitted to L&D for labor. The patient reports a leakage of fluid at 1050pm this evening. She is feeling painful contractions every 8-9 minutes. She was previously examined in triage earlier this evening with complaints of contractions. At this time she was 2.5/40/-3 and sent home with return precautions.     BRIDGET: 24 by LMP 10/19/23    Pregnancy course is  uncomplicated.     POB:  in 2018, associated with chlamydial infection s/p abx  PGYN: -fibroids/-cysts, denied STD hx, denies abnormal PAPs  PMH: none  PSH: none  SH: Denies tobacco use, EtOH use and illicit drug use during the pregnancy; Feels safe at home  Meds: prenatal vitamin  All: NKDA    24: vtx, posterior placenta, EFW 2841g 60%ile    Gen: NAD, well-appearing  Neuro: A&Ox3   Lungs: normal respiratory effort on RA  Abd: nontender, gravid  Ext: non-edematous, non-tender   SVE: 3/50/-3  SSE: cervix visualized, grossly ruptured    FHT: 145bpm, moderate variability, +accels, -decels  Young: irregular pattern    29y  @ 38w6d is admitted to L&D for labor.    A/P:   -Admit to L&D  -Consent  -Admission labs  -IV fluids  -Fetus: Cat I tracing. Continuous toco and fetal monitoring.   -GBS: Negative, no GBS ppx required   -Analgesia: epidural prn      Dr Morgan

## 2024-07-17 NOTE — OB RN DELIVERY SUMMARY - NS_SEPSISRSKCALC_OBGYN_ALL_OB_FT
EOS calculated successfully. EOS Risk Factor: 0.5/1000 live births ([facility] national incidence); GA=38w6d; Temp=98.4; ROM=18.55; GBS='Negative'; Antibiotics='No antibiotics or any antibiotics < 2 hrs prior to birth'

## 2024-07-17 NOTE — OB RN PATIENT PROFILE - PATERNAL PLAN FOR INVOLVEMENT, OB PROFILE
Encourage him to self isolate for seven days and if not additional symptoms he has nothing to worry about. If he starts developing additional symptoms he needs to let us know.    yes

## 2024-07-17 NOTE — OB PROVIDER H&P - ASSESSMENT
29y  @ 38w6d is admitted to L&D for labor.    A/P:   -Admit to L&D  -Consent  -Admission labs  -IV fluids  -Fetus: Cat I tracing. Continuous toco and fetal monitoring.   -GBS: Negative, no GBS ppx required   -Analgesia: epidural prn    Discussed with Dr. Morgan

## 2024-07-17 NOTE — PROGRESS NOTE ADULT - SUBJECTIVE AND OBJECTIVE BOX
29y  @ 38w6d is admitted to L&D for labor.  Patient was seen last pm with complaints of contractions.  Patient was not in labor and opted to walk.  Patient now presents with contractions and srom    FHR Cat 1    SROM @ 2250      Cervix 350/-3 on admission    EFW   3800 grams

## 2024-07-17 NOTE — OB PROVIDER DELIVERY SUMMARY - NSPROVIDERDELIVERYNOTE_OBGYN_ALL_OB_FT
Vaginal Delivery Summary    Procedure: Normal spontaneous vaginal delivery   Findings: Viable male infant delivered in cephalic presentation at 16:53, placenta delivered at 16:57.  Apgar scores 9/9.   Weight will be recorded after 1 hour to allow for skin-to-skin contact.  Laceration(s): None  Repair: N/A  EBL: 144  Complications: none    Procedure:   Patient felt rectal pressure and was found to be fully dilated, +2 station. She pushed effectively for 10 minutes. She delivered a viable male infant. Delayed cord clamping was performed for 30 seconds. Placenta delivered intact and pitocin was started at the delivery of placenta. Perineum and vagina were inspected, no laceration present. Adequate hemostasis was obtained.

## 2024-07-17 NOTE — OB PROVIDER LABOR PROGRESS NOTE - NS_OBIHIFHRDETAILS_OBGYN_ALL_OB_FT
baseline 125bpm, moderate variability, +accels, -decels
120 baseline, moderate variability, +accels, no decels

## 2024-07-17 NOTE — OB PROVIDER LABOR PROGRESS NOTE - ASSESSMENT
Cat 1 tracing   Will start pitocin 
38w6d in labor   - maternal vital signs stable  - cat I tracing, continue to monitor  - continue to monitor the labor progress

## 2024-07-17 NOTE — OB RN PATIENT PROFILE - NS_ADMSROM_OBGYN_ALL_OB_DT
- tap water enema - mineral oil PO  - tap water enema 16-Jul-2024 22:20 - mineral oil PO  - tap water enema  - CT A/P, recent, reviewed

## 2024-07-17 NOTE — OB RN DELIVERY SUMMARY - NSSELHIDDEN_OBGYN_ALL_OB_FT
[NS_DeliveryAttending1_OBGYN_ALL_OB_FT:[id number]],[NS_DeliveryAssist1_OBGYN_ALL_OB_FT:[id number]],[NS_DeliveryRN_OBGYN_ALL_OB_FT:[id number]]

## 2024-07-17 NOTE — OB PROVIDER H&P - NSLOWPPHRISK_OBGYN_A_OB
No previous uterine incision/Fields Pregnancy/Less than or equal to 4 previous vaginal births/No known bleeding disorder/No history of postpartum hemorrhage

## 2024-07-17 NOTE — OB NEONATOLOGY/PEDIATRICIAN DELIVERY SUMMARY - NSPEDSNEONOTESA_OBGYN_ALL_OB_FT
Called by Dr. Gutiérrez  to attend Vaginal delivery of a 29 years old  mom at 38 +6 weeks of gestation for meconium. Her serologies are negative for HIV/Hep B/RPR. Rubella  immune and rubeola equivocal. B+ , GBS negative. Her pregnancy is complicated by Anemia. ROM - 20 hours - mec stained. Baby vigorous at birth, apgar 9 and 9 at 1 and 5 minutes respectively. exam unremarkable. To mom.

## 2024-07-17 NOTE — OB PROVIDER H&P - NSHPPHYSICALEXAM_GEN_ALL_CORE
- - -
Vital Signs Last 24 Hrs  T(C): 36.9 (17 Jul 2024 00:28), Max: 36.9 (17 Jul 2024 00:28)  T(F): 98.4 (17 Jul 2024 00:28), Max: 98.4 (17 Jul 2024 00:28)  HR: 82 (17 Jul 2024 00:28) (68 - 82)  BP: 140/82 (17 Jul 2024 00:28) (112/68 - 140/82)  RR: 17 (17 Jul 2024 00:28) (16 - 17)    Parameters below as of 17 Jul 2024 00:28  Patient On (Oxygen Delivery Method): room air    Gen: NAD, well-appearing  Neuro: A&Ox3   Lungs: normal respiratory effort on RA  Abd: nontender, gravid  Ext: non-edematous, non-tender   SVE: 3/50/-3  SSE: cervix visualized, grossly ruptured    FHT: 145bpm, moderate variability, +accels, -decels  [place]: irregular pattern

## 2024-07-17 NOTE — OB PROVIDER DELIVERY SUMMARY - NSSELHIDDEN_OBGYN_ALL_OB_FT
[NS_DeliveryAttending1_OBGYN_ALL_OB_FT:[id number]],[NS_DeliveryAssist1_OBGYN_ALL_OB_FT:[id number]],[NS_DeliveryRN_OBGYN_ALL_OB_FT:[id number]]

## 2024-07-18 ENCOUNTER — APPOINTMENT (OUTPATIENT)
Dept: OBGYN | Facility: CLINIC | Age: 29
End: 2024-07-18

## 2024-07-18 VITALS
SYSTOLIC BLOOD PRESSURE: 124 MMHG | OXYGEN SATURATION: 100 % | DIASTOLIC BLOOD PRESSURE: 76 MMHG | RESPIRATION RATE: 18 BRPM | HEART RATE: 58 BPM | TEMPERATURE: 98 F

## 2024-07-18 LAB
HCT VFR BLD CALC: 29.4 % — LOW (ref 34.5–45)
HGB BLD-MCNC: 9.9 G/DL — LOW (ref 11.5–15.5)

## 2024-07-18 PROCEDURE — 36415 COLL VENOUS BLD VENIPUNCTURE: CPT

## 2024-07-18 PROCEDURE — 80053 COMPREHEN METABOLIC PANEL: CPT

## 2024-07-18 PROCEDURE — 85610 PROTHROMBIN TIME: CPT

## 2024-07-18 PROCEDURE — 83615 LACTATE (LD) (LDH) ENZYME: CPT

## 2024-07-18 PROCEDURE — 86780 TREPONEMA PALLIDUM: CPT

## 2024-07-18 PROCEDURE — 85018 HEMOGLOBIN: CPT

## 2024-07-18 PROCEDURE — 86900 BLOOD TYPING SEROLOGIC ABO: CPT

## 2024-07-18 PROCEDURE — 81001 URINALYSIS AUTO W/SCOPE: CPT

## 2024-07-18 PROCEDURE — 84156 ASSAY OF PROTEIN URINE: CPT

## 2024-07-18 PROCEDURE — 82570 ASSAY OF URINE CREATININE: CPT

## 2024-07-18 PROCEDURE — 85014 HEMATOCRIT: CPT

## 2024-07-18 PROCEDURE — 86901 BLOOD TYPING SEROLOGIC RH(D): CPT

## 2024-07-18 PROCEDURE — 85730 THROMBOPLASTIN TIME PARTIAL: CPT

## 2024-07-18 PROCEDURE — 85384 FIBRINOGEN ACTIVITY: CPT

## 2024-07-18 PROCEDURE — 59050 FETAL MONITOR W/REPORT: CPT

## 2024-07-18 PROCEDURE — 84550 ASSAY OF BLOOD/URIC ACID: CPT

## 2024-07-18 PROCEDURE — 85025 COMPLETE CBC W/AUTO DIFF WBC: CPT

## 2024-07-18 PROCEDURE — 86850 RBC ANTIBODY SCREEN: CPT

## 2024-07-18 RX ORDER — ACETAMINOPHEN 325 MG
3 TABLET ORAL
Qty: 60 | Refills: 0
Start: 2024-07-18 | End: 2024-07-22

## 2024-07-18 RX ORDER — PRENATAL VIT/IRON FUM/FOLIC AC 60 MG-1 MG
1 TABLET ORAL
Qty: 0 | Refills: 0 | DISCHARGE
Start: 2024-07-18

## 2024-07-18 RX ORDER — FERROUS SULFATE 325(65) MG
1 TABLET ORAL
Qty: 30 | Refills: 0
Start: 2024-07-18 | End: 2024-08-16

## 2024-07-18 RX ADMIN — Medication 600 MILLIGRAM(S): at 11:12

## 2024-07-18 RX ADMIN — Medication 600 MILLIGRAM(S): at 05:03

## 2024-07-18 RX ADMIN — Medication 975 MILLIGRAM(S): at 02:57

## 2024-07-18 RX ADMIN — Medication 975 MILLIGRAM(S): at 15:01

## 2024-07-18 RX ADMIN — Medication 600 MILLIGRAM(S): at 17:47

## 2024-07-18 RX ADMIN — Medication 975 MILLIGRAM(S): at 08:21

## 2024-07-18 RX ADMIN — Medication 600 MILLIGRAM(S): at 05:01

## 2024-07-18 RX ADMIN — Medication 3 MILLILITER(S): at 14:34

## 2024-07-18 RX ADMIN — Medication 975 MILLIGRAM(S): at 02:32

## 2024-07-18 RX ADMIN — Medication 975 MILLIGRAM(S): at 20:38

## 2024-07-18 RX ADMIN — Medication 1 TABLET(S): at 11:12

## 2024-07-18 NOTE — DISCHARGE NOTE OB - MEDICATION SUMMARY - MEDICATIONS TO TAKE
I will START or STAY ON the medications listed below when I get home from the hospital:    ibuprofen 600 mg oral tablet  -- 1 tab(s) by mouth every 6 hours  -- Indication: For pain    acetaminophen 325 mg oral tablet  -- 3 tab(s) by mouth every 6 hours  -- Indication: For pain    Prenatal Multivitamins with Folic Acid 1 mg oral tablet  -- 1 tab(s) by mouth once a day  -- Indication: For postpartum    ferrous sulfate 325 mg (65 mg elemental iron) oral tablet  -- 1 tab(s) by mouth once a day  -- Check with your doctor before becoming pregnant.  Do not chew, break, or crush.  May discolor urine or feces.  -- Indication: For anemia

## 2024-07-18 NOTE — PROGRESS NOTE ADULT - SUBJECTIVE AND OBJECTIVE BOX
INTERVAL HPI/OVERNIGHT EVENTS:  29y Female s/p labor epidural on 7/17    Vital Signs Last 24 Hrs  T(C): 36.4 (18 Jul 2024 03:41), Max: 36.8 (17 Jul 2024 19:22)  T(F): 97.5 (18 Jul 2024 03:41), Max: 98.2 (17 Jul 2024 19:22)  HR: 68 (18 Jul 2024 03:41) (49 - 142)  BP: 122/77 (18 Jul 2024 03:41) (110/57 - 149/75)  BP(mean): --  RR: 18 (18 Jul 2024 03:41) (16 - 18)  SpO2: 99% (18 Jul 2024 03:41) (74% - 100%)    Parameters below as of 18 Jul 2024 03:41  Patient On (Oxygen Delivery Method): room air            Patient's overall anesthesia satisfaction: Positive    Patient doing well     No headache      No residual numbness or weakness, sensory and motor function intact    No anesthetic complications or complaints noted or reported

## 2024-07-18 NOTE — PROGRESS NOTE ADULT - ASSESSMENT
A/P: 29y  now PPD 1s/p spontaneous vaginal delivery at 38w6d gestation,     -Vital signs stable  -Hgb: 11.7 -> AM labs pending   -Voiding, tolerating PO, bowel function nml   -Advance care as tolerated   -Continue routine postpartum care and education  -Healthy male infant, declines circumcision  -Dispo: Continue inpatient management   A/P: 29y  now PPD 1s/p spontaneous vaginal delivery at 38w6d gestation,     -Vital signs stable  -Hgb: 11.7 ->9.9   -Voiding, tolerating PO, bowel function nml   -Advance care as tolerated   -Continue routine postpartum care and education  -Healthy male infant, declines circumcision  -Dispo: Continue inpatient management

## 2024-07-18 NOTE — DISCHARGE NOTE OB - PATIENT PORTAL LINK FT
You can access the FollowMyHealth Patient Portal offered by [facility] by registering at the following website: [email or web address]. By joining [employer]’s FollowMyHealth portal, you will also be able to view your health information using other applications (apps) compatible with our system.

## 2024-07-18 NOTE — PROGRESS NOTE ADULT - SUBJECTIVE AND OBJECTIVE BOX
GALILEO GRIDER is a 29y  now PPD 1s/p spontaneous vaginal delivery at 38w6d gestation, uncomplicated.       No acute events overnight.   The patient has no complaints.  Pain controlled with current treatment regimen.   She is ambulating without difficulty and tolerating PO.   + flatus/-BM/+ voiding   She endorses appropriate lochia, which is decreasing.   She is *breastfeeding/bottle feeding without difficulty.   She denies fevers, chills, nausea and vomiting.   She denies lightheadedness, dizziness, palpitations, chest pain and SOB.     O:    T(C): 36.4 (24 @ 03:41), Max: 36.8 (24 @ 10:00)  HR: 68 (24 @ 03:41) (47 - 142)  BP: 122/77 (24 @ 03:41) (96/53 - 149/75)  RR: 18 (24 @ 03:41) (16 - 18)  SpO2: 99% (24 @ 03:41) (74% - 100%)    Gen: NAD, AOx3  Pulm: Resting comfortable on room air   Abdomen:  Soft, non-tender, non-distended  Uterus:  Fundus firm below umbilicus  VE:  Expectant lochia  Ext:  Non-tender and non-edematous                          11.7   8.28  )-----------( 173      ( 2024 01:24 )             35.1         134<L>  |  100  |  11.4  ----------------------------<  86  4.0   |  19.0<L>  |  0.49<L>    Ca    9.3      2024 01:24    TPro  6.5<L>  /  Alb  3.3  /  TBili  <0.2<L>  /  DBili  x   /  AST  23  /  ALT  10  /  AlkPhos  220<H>         GALILEO GRIDER is a 29y  now PPD 1s/p spontaneous vaginal delivery at 38w6d gestation, uncomplicated.       No acute events overnight.   The patient has no complaints.  Pain controlled with current treatment regimen.   She is ambulating without difficulty and tolerating PO.   + flatus/-BM/+ voiding   She endorses appropriate lochia, which is decreasing.   She is *breastfeeding/bottle feeding without difficulty.   She denies fevers, chills, nausea and vomiting.   She denies lightheadedness, dizziness, palpitations, chest pain and SOB.     O:    T(C): 36.4 (24 @ 03:41), Max: 36.8 (24 @ 10:00)  HR: 68 (24 @ 03:41) (47 - 142)  BP: 122/77 (24 @ 03:41) (96/53 - 149/75)  RR: 18 (24 @ 03:41) (16 - 18)  SpO2: 99% (24 @ 03:41) (74% - 100%)    Gen: NAD, AOx3  Pulm: Resting comfortable on room air   Abdomen:  Soft, non-tender, non-distended  Uterus:  Fundus firm below umbilicus  VE:  Expectant lochia  Ext:  Non-tender and non-edematous                          11.7   8.28  )-----------( 173      ( 2024 01:24 )             35.1         134<L>  |  100  |  11.4  ----------------------------<  86  4.0   |  19.0<L>  |  0.49<L>    Ca    9.3      2024 01:24    TPro  6.5<L>  /  Alb  3.3  /  TBili  <0.2<L>  /  DBili  x   /  AST  23  /  ALT  10  /  AlkPhos  220<H>                              9.9    x     )-----------( x        ( 2024 07:20 )             29.4           134<L>  |  100  |  11.4  ----------------------------<  86  4.0   |  19.0<L>  |  0.49<L>    Ca    9.3      2024 01:24    TPro  6.5<L>  /  Alb  3.3  /  TBili  <0.2<L>  /  DBili  x   /  AST  23  /  ALT  10  /  AlkPhos  220<H>  07-              Urinalysis Basic - ( 2024 01:40 )    Color: Yellow / Appearance: Clear / S.015 / pH: x  Gluc: x / Ketone: Negative mg/dL  / Bili: Negative / Urobili: 1.0 mg/dL   Blood: x / Protein: Negative mg/dL / Nitrite: Negative   Leuk Esterase: Trace / RBC: 48 /HPF / WBC 5 /HPF   Sq Epi: x / Non Sq Epi: 4 /HPF / Bacteria: Occasional /HPF        PT/INR - ( 2024 01:24 )   PT: 10.3 sec;   INR: 0.93 ratio         PTT - ( 2024 01:24 )  PTT:25.9 sec

## 2024-07-18 NOTE — DISCHARGE NOTE OB - CARE PROVIDER_API CALL
Marin Johnson  Obstetrics and Gynecology  [address], [address]  [place] [address]  Phone: [phone]  Fax: [phone]  Follow Up Time:

## 2024-07-19 DIAGNOSIS — O99.013 ANEMIA COMPLICATING PREGNANCY, THIRD TRIMESTER: ICD-10-CM

## 2024-07-19 DIAGNOSIS — O09.293 SUPERVISION OF PREGNANCY WITH OTHER POOR REPRODUCTIVE OR OBSTETRIC HISTORY, THIRD TRIMESTER: ICD-10-CM

## 2024-07-19 DIAGNOSIS — Z3A.38 38 WEEKS GESTATION OF PREGNANCY: ICD-10-CM

## 2024-07-19 DIAGNOSIS — D64.9 ANEMIA, UNSPECIFIED: ICD-10-CM

## 2024-07-19 DIAGNOSIS — O47.1 FALSE LABOR AT OR AFTER 37 COMPLETED WEEKS OF GESTATION: ICD-10-CM

## 2024-07-22 NOTE — ED PROVIDER NOTE - EYES NEGATIVE STATEMENT, MLM
7/22/2024      Tobias Haynes  [address] [facility] [address]      Dear Colleague,    Thank you for referring your patient, Tobias Haynes, to the [facility] [place]. Please see a copy of my visit note below.    [facility] Dermato-allergology Note  Office visit  Encounter Date: Jul 22, 2024  ____________________________________________    CC: Allergy Testing Followup (Patch testing day 1)      HPI:  (Jul 22, 2024)  Ms. Tobias Haynes is a(n) 25 year old female who presents today as a return patient for allergy tests as planned  - Follow-up in Derm-Allergy clinic for patch tests (and prick tests pending Dr. Ann's blood results from 4/19/24); stop all antihistamines 5-7 days prior  - 4/19/24 Results from specific IgEs ordered by Dr. Ann:  The following were negative:  Alternaria alternata  Aspergillus fumigatus   Birch  Cat  Cladosporium herbarum   Cocklebur  Cockroach  Common Ragweed  Dermatophagoides farinae   Dermatophagoides pteronyssinus   Dog  Elm  Epicoccum purpur   Maple ([place])  Rough Marsh Elder  Shade Grass  White Oak  White Gabriel  - Otherwise feeling well in usual state of health    Physical Exam:  General: In no acute distress, well-developed, well-nourished  Eyes: no conjunctivitis  ENT: no signs of rhinitis   Pulmonary: no wheezing or coughing  Skin: Focused examination of the skin on test sites was performed = see test results below  No active eczematous skin lesions on tests sites, particularly back    Earlier History and Allergy Exams:  (Apr 23, 2024)  New patient for allergy consultation  - Referred by Dr. Josiah Ann for North American panel patch test for rash. 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  Recently reacted to coban on her arm.  At home seems to get rashes on face- of note rubbing alcohol helps the rash.   - Recently had Nexplanon replaced ~1.5 weeks ago (see 4/11/24  note by Lashawn Christianson, NP with [facility])  - Wrap that was used on arm caused itchy rash; she has never had a wrap on as long as it was one  - Iodine was used by OB/GYN's staff to disinfect the area  - Wears latex-free gloves at her job as a phlebotomist  - She, mom, and sister all have itchy nose  - For her, will spread out from her nose to her face; lasts for a couple hours  - Has had hay fever and spontaneous allergies since high school  - Grimes is a trigger in late spring  - Asthma from childhood to present  - Uses albuterol infrequently  - Dyes her hair, but last done     Skin: Focused examination of the face, hairline, arms, hands was performed.  - On the left upper arm immediately above the elbow, there is an eczematous reaction with slight hyperpigmentation, xerosis, and papular vesicles present.   - Hyperpigmentation on dorsal lateral and palmar right hand.  - Eczematous plaques on the forehead along the hairline.    Past Medical History:   There is no problem list on file for this patient.    No past medical history on file.    Allergies:  No Known Allergies    Medications:  Current Outpatient Medications   Medication Sig Dispense Refill     albuterol (PROAIR HFA/PROVENTIL HFA/VENTOLIN HFA) 108 (90 Base) MCG/ACT inhaler Inhale 1-2 puffs into the lungs       etonogestrel (NEXPLANON) 68 MG IMPL Inject 1 each Subcutaneous       ibuprofen (ADVIL/MOTRIN) 600 MG tablet Take 1 tablet (600 mg) by mouth every 6 hours as needed for moderate pain 30 tablet 0     No current facility-administered medications for this visit.     Social History:  The patient works as a phlebotomist. Patient has the following hobbies or non-occupational exposure: spending time with her son.    Family History:  No family history on file.    Previous Labs, Allergy Tests, Dermatopathology, Imagin24 Dr. Josiah Ann ([facility])  From [facility]:  History of a few different allergy concerns. Nose congestion, sneezing,  itchy watery eyes. All year-round. Seasonally worse. Few years. Medications are not adequately helping. At times symptoms can be bad. Interested in allergy testing.    She currently uses nonlatex gloves at work. Has noticed itching, burning, rash and discoloration on both hands. For the past 1 year. Interested in testing for allergies for that.    Itching of the tip of the nose as well. Sometimes itching of the cheek and forehead as well. No obvious rash. This has been ongoing for more than 1 year.    Currently does have history of asthma. Per patient controlled. Using albuterol as needed.    History of local reaction with itching and redness after using wrap     Impressions:  Rash-both hands  Itching-nose, cheeks, forehead  Allergic rhinitis  Allergic conjunctivitis  Inadequate response to treatment  Per patient suspected allergy to nonlatex gloves     Recommendations:  We talked about allergic and nonallergic causes  We talked about allergy skin testing versus blood testing  Check blood test for environmental allergies  We talked about allergy patch testing. Suggested referral to Dr. Francisco Powers for possible North American panel patch testing and second opinion regarding skin rash with discoloration affecting both hands that is possibly after nonlatex glove exposure per patient  Suggested triamcinolone-0.1% ointment topically twice daily as needed  Flonase 2 sprays each nostril daily  Astelin 2 sprays each nostril twice daily  Pataday-0.2% eyedrops-1 drop in each eye once daily as needed  Zyrtec 10 mg by mouth once or twice daily  Other self-care techniques discussed  All questions answered  Follow-up in a month or sooner if problems     2/22/24 ED visit for Acute Bronchitis with Asthma ([facility])  From [facility]:  Presents to the emergency department for evaluation of cough. The patient states that she has had a productive cough since Saturday 2/17. She states that she did have a fever of 101 F. She states that she  went to urgent care where she tested negative for influenza. She states that was given prednisone and took this for two nights. She states that this did help with her cough. She states that today her cough is worse and is accompanied with wheezing. She also endorses rhinorrhea. She states that she has been using over the counter cough medications. She also notes that she tested negative for COVID-19 with an at home test kit. She also reports that she has a history of asthma and did use her inhaler this morning. She denies sore throat, and taking prescription medications for this. She denies further fevers or chest pain or pleuritic pain.    From A&P:  Vitally stable here although slightly tachycardic. She is overall well appearing. She is not hypoxic or in respiratory distress. She had recent influenza testing which was negative. COVID PCR testing here is negative. Chest x-ray obtained shows no lobar consolidation or significant pleural effusion. Denies chest pain and I doubt ACS or PE in light of the above presentation and symptoms. Overall suspect acute bronchitis. We will extend her course of steroids and prescribed medications below. She request for her own nebulizer which was written for her. Recommend she follow-up with her primary care doctor regards to her ER visit today. She is comfortable this plan. Discussed return precautions for the emergency department. All questions as per discharge.    Referred By: Josiah Ann MD ([facility])  [id number] [place]  [address] [address]     Allergy Tests:  Ordered by Dr. Ann on 4/19/24 and Pending as of 4/22/24 - see above in Prior Exams section for 7/22/24 note  ALTERNARIA ALTERNATA (M6) IGE   ASPERGILLUS FUMIGATUS IGE   BIRCH (T3) IGE  CAT DANDER (E1) IGE   CLADOSPORIUM HERBARUM IGE   COCKLEBUR IGE   COCKROACH (I6) IGE   COMMON RAGWEED (W1) IGE   D FARINAE (D2) IGE   D PTERONYSSINUS (D1) IGE   DOG DANDER (E5) IGE   ELM (T8) IGE   EPICOCCUM  PURPUR IGE   MAPLE (BOX ELDER) (T1)IGE   ROUGH MARSH ELDER (W16) IGE  AGUSTÍN GRASS (G6) IGE   WHITE GOPAL (T15) IGE   WHITE OAK (T7) IGE     Order for Future Allergy Testing:    [x] Outpatient  [] Inpatient: [facility]..../ Bed ....       Skin Atopy (atopic dermatitis) [x] Yes   [] No .........see HPI  Contact allergies:   [x] Yes   [] No ..........see HPI  Hand eczema:   [] Yes   [x] No           Leading hand:   [x] R   [] L       [] Ambidextrous         Drug allergies:        [] Yes   [x] No  which?......    Urticaria/Angioedema  [] Yes   [x] No .........  Food Allergy:  [] Yes   [x] No  which?......  Pets :  [] Yes   [x] No  which?......none at home and no reaction to cats or dogs        [x]  Rhinitis   [] Conjunctivitis   [] Sinusitis   [] Polyposis   [] Otitis   [] Pharyngitis         []  Postnasal drip    []  none  Operations:   [] Tonsils   [] Septum   [] Sinus   [] Polyposis        [x] Asthma bronchiale   [] Coughing      []  none  Symptoms (mostly Rhinoconjunctivitis and Asthma) aggravated by:  Season   [] I   [] II   [] III   [x] IV   [x]V   [x]VI   []VII   []VIII   []IX   []X   []XI   []XII     [x] perennial   Day time      [] morning   [] noon      [] evening        [] night    [] whole day........  [x]  none  Location/changes    [] inside        [] outside   [] mountains    [] sea     [] others.............   [x]  none  Triggers, specific     [] animals     [] plants     [] dust              [] others ...........................    [x]  none  Triggers, others       [] work          [] psyche    [] sport            [] others .............................  [x]  none  Irritant                [] phys efforts [] smoke    [] heat/cold     [] odors  []others............... [x]  none    Order for PATCH TESTS  Reason for tests (suspected allergy): recurrent eczema on hands, face, and acutely on upper arms to forearms  Known previous allergies: none  Standardized panels  [x] Standard panel (40 tests)  [x] Preservatives  & Antimicrobials (31 tests)  [x] Emulsifiers & Additives (25 tests)   [x] Perfumes/Flavours & Plants (25 tests)  [] Hairdresser panel (12 tests)  [x] Rubber Chemicals (22 tests)  [x] Plastics (26 tests)  [] Colorants/Dyes/Food additives (20 tests)  [] Metals (implants/dental) (24 tests)  [] Local anaesthetics/NSAIDs (13 tests)  [] Antibiotics & Antimycotics (14 tests)   [] Corticosteroids (15 tests)   [] Photopatch test (62 tests)   [] others: ...      [] Patient's own products: ...  DO NOT test if chemical or biological identity is unknown!   always ask from patient the product information and safety sheets (MSDS)       Order for PRICK TESTS    Reason for tests (suspected allergy): perennial RC with seasonal aggravation in late spring/early summer  Known previous allergies: see above prior records - specific IgE in 4/2024 blood tests all negative    Standardized prick panels  [x] Atopic panel (20 tests)  [] Pediatric Panel (12 tests)  [] Milk, Meat, Eggs, Grains (20 tests)   [] Dust, Epithelia, Feathers (10 tests)  [] Fish, Seafood, Shellfish (17 tests)  [] Nuts, Beans (14 tests)  [] Spice, Vegetable, Fruit (17 tests)  [] Pollen Panel = Tree, Grass, Weed (24 tests)  [] Others: ...      [] Patient's own products: ...  DO NOT test if chemical or biological identity is unknown!   always ask from patient the product information and safety sheets (MSDS)     Standardized intradermal tests  [x] Penicillium notatum [x] Aspergillus fumigatus [x] House dust mites D.far & D. pteron  [] Cat    [] dog  [x] Others: Alternaria  [] Bee venom   [] Wasp venom  !!Specific protocol with dilutions!!       Order for Drug allergy tests (prick & Intradermal & patch tests)    [] Penicillin G  [] Ampicillin [] Cefazolin   [] Ceftriaxone   [] Ceftazidime  [] Bactrim    [] Others: ...  Order for ... as test date    [] Patient needs consultation with Allergy team (changes of tests may apply)  [x] Tests discussed with Allergy team (can have  direct appointment for allergy tests)     RESULTS & EVALUATION of PATCH TESTS    Jul 22, 2024 application of patch tests:    Patch test readings after     [x] 2 days, [] 3 days [x] 4 days, [] 5 days,  Other duration: ...    STANDARD Series                                          # Substance 2 days 4 days remarks     1 Ronnie Mix [C] - -       2 Colophony - -       3  2-Mercaptobenzothiazole  - -       4 Methylisothiazolinone - -       5 Carba Mix - -       6 Thiuram Mix [A] - -       7 Bisphenol A Epoxy Resin - -       8 M-Lzuh-Kovfsfjkquu-Formaldehyde Resin - -       9 Mercapto Mix [A] - -       10 Black Rubber Mix- PPD [B] - -       11 Potassium Dichromate  -  -       12 Balsam of [place] (Myroxylon Pereirae Resin) - -       13 Nickel Sulphate Hexahydrate - -       14 Mixed Dialkyl Thiourea - -       15 Paraben Mix [B] - -       16 Methyldibromo Glutaronitrile - -       17 Fragrance Mix 8% - -       18 2-Bromo-2-Nitropropane-1,3-Diol (Bronopol) CT - -       19 Lyral - -       20 Tixocortol-21- Pivalate CT - -       21 Diazolidinyl urea (Germall II) - -        22 Methyl Methacrylate - -       23 Cobalt (II) Chloride Hexahydrate - -       24 Fragrance Mix II  - -       25 Compositae Mix - -       26 Benzoyl Peroxide - -       27 Bacitracin - -       28 Formaldehyde - -       29 Methylchloroisothiazolinone / Methylisothiazolinone - -       30 Corticosteroid Mix CT - -       31 Sodium Lauryl Sulfate - -       32 Lanolin Alcohol - -       33 Turpentine - -       34 Cetylstearylalcohol - -       35 Chlorhexidine Dicluconate - -       36 Budesonide - -       37 Imidazolidinyl Urea  - -       38 Ethyl-2 Cyanoacrylate - -       39 Quaternium 15 (Dowicil 200) - -       40 Decyl Glucoside - -     PRESERVATIVES & ANTIMICROBIALS        # Substance 2 days 4 days remarks   41 1  1,2-Benzisothiazoline-3-One, Sodium Salt - -     2  1,3,5-Jesus Manuel (2-Hydroxyethyl) - Hexahydrotriazine (Grotan BK) - -     3 3-Sastpzmeqzhno-0-Nitro-1,  3-Propanediol NA NA     4  3, 4, 4' - Triclocarban - -    45 5 4 - Chloro - 3 - Cresol - -     6 4 - Chloro - 4 - Xylenol (PCMX) - -     7 7-Ethylbicyclooxazolidine (Bioban [id number]) - -     8 Benzalkonium Chloride CT - -     9 Benzyl Alcohol - -    50 10 Cetalkonium Chloride - -     11 Cetylpyrimidine Chloride  - -     12 Chloroacetamide - -     13 DMDM Hydantoin - -     14 Glutaraldehyde - -    55 15 Triclosan - -     16 Glyoxal Trimeric Dihydrate - -     17 Iodopropynyl Butylcarbamate - -     18 Octylisothiazoline - -     19 Bithionol CT NA NA    60 20 Bioban P 1487 (Nitrobutyl) Morpholine/(Ethylnitro-Trimethylene) Dimorpholine - -     21 Phenoxyethanol - -     22 Phenyl Salicylate - -     23 Povidone Iodine - -     24 Sodium Benzoate - -    65 25 Sodium Disulfite - -     26 Sorbic Acid - -     27 Thimerosal - -     28 Melamine Formaldehyde Resin - -     29 Ethylenediamine Dihydrochloride - -      Parabens      70 30 Butyl-P-Hydroxybenzoate - -     31 Ethyl-P-Hydroxybenzoate - -     32 Methyl-P-Hydroxybenzoate - -    73 33 Propyl-P-Hydroxybenzoate - -    EMULSIFIERS & ADDITIVES       # Substance 2 days 4 days remarks   74 1 Polyethylene Glycol-400 - -    75 2 Cocamidopropyl Betaine - -     3 Amerchol L101 - -     4 Propylene Glycol - -     5 Triethanolamine - -     6 Sorbitane Sesquiolate CT - -    80 7 Isopropylmyristate - -     8 Polysorbate 80 CT - -     9 Amidoamine   (Stearamidopropyl Dimethylamine) - -     10 Oleamidopropyl Dimethylamine - -     11 Lauryl Glucoside - -    85 12 Coconut Diethanolamide  - -     13 2-Hydroxy-4-Methoxy Benzophenone (Oxybenzone) - -     14 Benzophenone-4 (Sulisobenzon) - -     15 Propolis - -     16 Dexpanthenol - -    90 17 Abitol - -     18 Tert-Butylhydroquinone - -     19 Benzyl Salicylate - -     20 Dimethylaminopropylamin (DMPA) - -     21 Zinc Pyrithione (Zinc Omadine)  - -    95 22 Jesus Manuel(Hydroxymethyl) Nitromethane  - -      Antioxidant       23 Dodecyl Gallate - -     24  Butylhydroxyanisole (BHA) - -     25 Butylhydroxytoluene (BHT) - -     26 Di-Alpha-Tocopherol (Vit E) - -    100 27 Propyl Gallate - -    PERFUMES, FLAVORS & PLANTS        # Substance 2 days 4 days remarks   101 1 Benzyl Cinnamate - -     2 Di-Limonene (Dipentene) - -     3 Cananga Odorata (Corey Nicole) (I) - -     4 Lichen Acid Mix - -    105 5 Mentha Piperita Oil (Peppermint Oil) - -     6 Sesquiterpenelactone mix - -     7 Tea Tree Oil, Oxidized - -     8 Wood Tar Mix - -     9 Abietic Acid - -    110 10 Lavendula Angustifolia Oil (Lavender Oil) - -     11 Fragrance mix II CT * 14% - -      Fragrance Mix I       12 Oakmoss Absolute - -     13 Eugenol - -     14 Geraniol - -    115 15 Hydroxycitronellal - -     16 Isoeugenol - -     17 Cinnamic Aldehyde - -     18 Cinnamic Alcohol  - -      Fragrance mix II       19 Citronellol - -    120 20 Alpha-Hexylcinnamic Aldehyde    - -     21 Citral - -     22 Farnesol - -    123 23 Coumarin - -    Hexylcinnamic aldehyde, Coumarin, Farnesol, Hydroxyisohexy3-cyclohexene carboxaldehyde, citral, citrolellol  RUBBER CHEMICALS        # Substance 2 days 4 days remarks     Carbamate      124 1 Zinc Bis ( Diethyldithio carbamate ) (ZDEC) - -    125 2 Zinc Bis (Dibutyldithiocarbamate) - -     3 1,3-Diphenylguanidine (DPG) - -      Thiourea       4 Dibutylthiourea - -     5 Diphenyltiourea - -     6 Thiourea - -      Mercapto chemicals      130 7 Morpholinyl Mercaptobenzothiazole - -     8 X-Rrpviwxobi-5-Benzothiazyl-Sulfenamide - -     9 Dibenzothiazyl Disulfide - -      Thiuram chemicals       10 Dipentamethylenethiuram Disulfide - -     11 Tetraethylthiuram Disulfide (Disulfiram) - -    135 12 Tetramethylthiuram Disulfide - -     13 Tetramethyl Thiuram Monosulfide (TMTM) - -      4-Phenylenediamine derivatives       14 N-Isopropyl-N'-Phenyl-P-Phenylenediamine (IPPD) - -     15 Goecjazk-N-Gwbtljctxjooxwvj (DPPD) - -      Various Rubber Additives       16 Hydroquinone  Monobenzylether  - -    140 17 Hexamethylenetetramine - -     18 4,4'-Dihydroxybiphenyl - -     19 Cyclohexylthiophtalimide - -    143 20 N-Phenyl-B-Naphthylamine - -    PLASTICS (Acrylates/Epoxy Systems)       # Substance 2 days 4 days remarks     Acrylates - -    144 1 2-Hydroxyethyl Methacrylate (HEMA) - -    145 2 1,4-Butandioldimethacrylate (BUDMA) - -     3  2-Ethylhexyl Acrylate - -     4 Bisphenol-A-Dimethacrylate  - -     5 Diurethane-Dimethacrylate - -     6 Ethyleneglycoldimethacrylate (EGDMA) - -    150 7 Pentaerythritoltriacrylate (ZAKI) - -     8 Triethylene Glycol Dimethacrylate (TEGDMA) - -      Synthetic material/additives        9 H-Xnjt-Zcyirkwfrwy - -     10 Tricresyl Phosphate - -     11 7-Iszi-Qofmhyimbkrrb - -    155 12 Dioctyl phtalate(DEHP,DOP) / (Dimethylhexylphthalate)  - -     13 Dibutylphthalate - -     14 Dimethylphthalate - -     15 Toluene-2,4-Diisocyanate - -     16 Diphenylmethane-4,4''-Diisocyanate NA NA      EPOXY RESIN SYSTEMS        Reactive Solvents - -    160 17 Cresyl Glycidyl Ether - -     18 Butyl Glycidyl Ether - -     19 Phenyl Glycidyl Ether - -     20 1,4-Butanediol Diglycidyl Ether - -     21 1,6-Hexanediole Diglycidyl Ether - -      Hardener / Accelerator - -    165 22 Triethylenetetramine - -     23 Diethylenetriamine - -     24 Isophorone Diamine (IPD) - -     25 N,N-Dimethyl-P-Toluidine - -    169 26 Isobornyl Acrylate - -       Results of patch tests:                         Interpretation:  - Negative                    A    = Allergic      (+) Erythema    TI   = Toxic/irritant   + E + Infiltration    RaP = Relevance at Present     ++ E/I + Papulovesicle   Rpr  = Relevance Previously     +++ E/I/P + Blister     nR   = No Relevance    Atopy Screen (Placed Jul 22, 2024)  No Substance Readings (15 min) Evaluation   POS Histamine 1mg/ml ++    NEG NaCl 0.9% -      No Substance Readings (15 min) Evaluation   1 Alternaria alternata (tenuis)  -    2 Cladosporium herbarum  -    3 Aspergillus fumigatus -    4 Penicillium notatum -    5 Dermatophagoides pteronyssinus +    6 Dermatophagoides farinae ?    7 Dog epithelium (canis spp) -    8 Cat hair (michelle catus) +    9 Cockroach   (Blatella americana & germanica) -    10 Grass mix midwest   (Cherelle, Orchard, Redtop, Shade) -    11 Stephen grass (sorghum halepense) -    12 Weed mix   (common Cocklebur, Lamb s quarters, rough redroot Pigweed, Dock/Sorrel) -    13 Mug wort (artemisia vulgare) -    14 Ragweed giant/short (ambrosia spp) -    15 White birch (Betula papyrifera) -    16 Tree mix 1 (Pecan, Maple BHR, Oak RVW, american [place], black [place]) -    17 Red cedar (juniperus virginia) -    18 Tree mix 2   (white Gabriel, river/red Birch, black [place], common Dickey, american Elm) -    19 Box elder/Maple mix (acer spp) -    20 Green Spring shagbark (carya ovata) -    Conclusion: Due to questionable/borderline reactions, will perform IDT.    Intradermal Testing (Placed Jul 22, 2024)  No Substance Conc.  Reading (15min)  immediate Papule [mm] / Erythema [mm] Reading   (... days)  delayed Papule [mm] / Erythema [mm] Remarks   DF Standard Dust Mite - D. Farinae 1:10 ? 5/5  -    DP Standard Dust Mite - D. Pteronyssinus 1:10 - -  -    A Aspergillus fumigatus  1:10 - -  -    P Penicillium notatum 1:10 - -  -     Alternaria alternaria 1:10 - -  -    Conclusion: Questionable reaction to dust mite Dermatophagoides farinae, but otherwise no signs for immediate-type reaction. Over the next 2 and 4 days, we will check for delayed-type reactions..      [] No relevant allergic reaction observed      [] Allergic reaction diagnosed against following allergens:      Interpretation/Remarks:   See later    [] Patient information given  [] ACDS CAMP information (# ....) to following compounds:  Standard Search:   Advanced Search:   [] General information to following compounds:       Assessment & Plan:    ==> Final Diagnosis:     # Recurrent dermatitis on  hands, face, and acute flares to coban on upper forearms  DDx: Atopic contact dermatitis (Occupational? To rubber gloves? Disinfectants/soaps?)  DDx: Irritant dermatitis with atopic dermatitis  * chronic illness with exacerbation, progression, side effects from treatment    # Suspicion for atopic predisposition with:  Perennial RC with some seasonal aggravation in late spring/early summer = all blood tests negatives!  Dry, hypersensitive skin (atopic dermatitis?)  * chronic illness with exacerbation, progression, side effects from treatment    These conclusions are made at the best of one's knowledge and belief based on the provided evidence such as patient's history and allergy test results and they can change over time or can be incomplete because of missing information.    ==> Treatment Plan:    >> Until patch testing can be completed:  - Continue tacrolimus (Protopic) 0.1% ointment BID.  - Only use triamcinolone 0.1% ointment for acute flares until resolved, and then on weekends in place of tacrolimus if needed.     Procedures Performed: Allergy tests, including prick, intradermal , and patch tests     Staff Involved: Provider, Staff, Resident, and Scribe    Scribe Disclosure:   I, JAYME ARSHAD, am serving as a scribe to document services personally performed by Francisco Powers MD based on data collection and the provider's statements to me.     Staff Physician Comments:  I was present with the scribe who participated in the documentation of the note. I have verified the history and personally performed the physical exam and medical decision making. I agree with the assessment and plan as documented in the note. I have reviewed and if necessary amended the note.      Also, I saw and evaluated the patient with the resident and I agree with the assessment and plan as documented in the resident's note.    Francisco Powers MD  Professor  Head of Dermato-Allergy Division  Department of Dermatology  [facility]  [place], [place], [place]    Follow-up in Derm-Allergy clinic for 1st readings of patch tests after 2 days  ___________________________    I spent a total of 40 minutes with Tobias Haynes during today s visit. This time was spent discussing all the individual test results, correlating them to the clinical relevance, counseling the patient and/or coordinating care and performing allergy tests .      Again, thank you for allowing me to participate in the care of your patient.        Sincerely,        Francisco Powers MD   no discharge, no irritation, no pain, no redness, and no visual changes.

## 2024-07-24 ENCOUNTER — NON-APPOINTMENT (OUTPATIENT)
Age: 29
End: 2024-07-24

## 2024-07-24 DIAGNOSIS — Z3A.36 36 WEEKS GESTATION OF PREGNANCY: ICD-10-CM

## 2024-07-24 DIAGNOSIS — Z3A.34 34 WEEKS GESTATION OF PREGNANCY: ICD-10-CM

## 2024-07-24 DIAGNOSIS — Z3A.38 38 WEEKS GESTATION OF PREGNANCY: ICD-10-CM

## 2024-07-31 ENCOUNTER — NON-APPOINTMENT (OUTPATIENT)
Age: 29
End: 2024-07-31

## 2024-08-19 ENCOUNTER — OUTPATIENT (OUTPATIENT)
Dept: OUTPATIENT SERVICES | Facility: HOSPITAL | Age: 29
LOS: 1 days | Discharge: HOME | End: 2024-08-19

## 2024-08-19 DIAGNOSIS — D64.9 ANEMIA, UNSPECIFIED: ICD-10-CM

## 2024-08-22 ENCOUNTER — APPOINTMENT (OUTPATIENT)
Dept: PSYCHIATRY | Facility: CLINIC | Age: 29
End: 2024-08-22

## 2024-08-22 PROCEDURE — 90834 PSYTX W PT 45 MINUTES: CPT | Mod: 95

## 2024-08-26 ENCOUNTER — APPOINTMENT (OUTPATIENT)
Dept: OBGYN | Facility: CLINIC | Age: 29
End: 2024-08-26
Payer: MEDICAID

## 2024-08-26 VITALS
DIASTOLIC BLOOD PRESSURE: 78 MMHG | WEIGHT: 169 LBS | BODY MASS INDEX: 31.1 KG/M2 | HEIGHT: 62 IN | SYSTOLIC BLOOD PRESSURE: 120 MMHG

## 2024-08-26 DIAGNOSIS — Z01.419 ENCOUNTER FOR GYNECOLOGICAL EXAMINATION (GENERAL) (ROUTINE) W/OUT ABNORMAL FINDINGS: ICD-10-CM

## 2024-08-26 PROCEDURE — 99395 PREV VISIT EST AGE 18-39: CPT

## 2024-08-26 PROCEDURE — 0503F POSTPARTUM CARE VISIT: CPT

## 2024-08-26 PROCEDURE — 99459 PELVIC EXAMINATION: CPT

## 2024-08-26 NOTE — HISTORY OF PRESENT ILLNESS
[FreeTextEntry1] : 29-year-old G2, P2 status post vaginal delivery on July 17, 2024 presents for postpartum checkup.  She is bottlefeeding only and denies pain bleeding or discharge.  No menstrual cycle yet.  She desires IUD for contraception.

## 2024-08-26 NOTE — DISCUSSION/SUMMARY
[FreeTextEntry1] : 29-year-old -0-0-2 status post vaginal delivery on 2024 presents for postpartum checkup.  She is bottlefeeding and desires IUD for contraception.  Physical examination is normal.  Pap GC chlamydia sent.  Patient to return in 4 weeks for IUD placement at the meanwhile to use condoms for contraception.

## 2024-08-26 NOTE — PHYSICAL EXAM
[Chaperone Present] : A chaperone was present in the examining room during all aspects of the physical examination [[id number]] : A chaperone was present during the pelvic exam. [FreeTextEntry2] : Shabnam [Appropriately responsive] : appropriately responsive [Alert] : alert [No Acute Distress] : no acute distress [No Lymphadenopathy] : no lymphadenopathy [Regular Rate Rhythm] : regular rate rhythm [No Murmurs] : no murmurs [Clear to Auscultation B/L] : clear to auscultation bilaterally [Soft] : soft [Non-tender] : non-tender [Non-distended] : non-distended [No HSM] : No HSM [No Lesions] : no lesions [No Mass] : no mass [Oriented x3] : oriented x3 [Examination Of The Breasts] : a normal appearance [No Masses] : no breast masses were palpable [Labia Majora] : normal [Labia Minora] : normal [Normal] : normal [Uterine Adnexae] : normal [Declined] : Patient declined rectal exam

## 2024-08-27 LAB
C TRACH RRNA SPEC QL NAA+PROBE: NOT DETECTED
HPV HIGH+LOW RISK DNA PNL CVX: NOT DETECTED
N GONORRHOEA RRNA SPEC QL NAA+PROBE: NOT DETECTED
SOURCE TP AMPLIFICATION: NORMAL

## 2024-09-03 ENCOUNTER — APPOINTMENT (OUTPATIENT)
Dept: HEMATOLOGY ONCOLOGY | Facility: CLINIC | Age: 29
End: 2024-09-03

## 2024-09-03 DIAGNOSIS — O99.019 ANEMIA COMPLICATING PREGNANCY, UNSPECIFIED TRIMESTER: ICD-10-CM

## 2024-09-03 LAB — CYTOLOGY CVX/VAG DOC THIN PREP: NORMAL

## 2024-09-11 DIAGNOSIS — M79.641 PAIN IN RIGHT HAND: ICD-10-CM

## 2024-09-11 DIAGNOSIS — M79.642 PAIN IN RIGHT HAND: ICD-10-CM

## 2024-09-12 ENCOUNTER — APPOINTMENT (OUTPATIENT)
Dept: ORTHOPEDIC SURGERY | Facility: CLINIC | Age: 29
End: 2024-09-12
Payer: MEDICAID

## 2024-09-12 VITALS
HEIGHT: 62 IN | WEIGHT: 169 LBS | SYSTOLIC BLOOD PRESSURE: 112 MMHG | HEART RATE: 71 BPM | BODY MASS INDEX: 31.1 KG/M2 | DIASTOLIC BLOOD PRESSURE: 80 MMHG

## 2024-09-12 DIAGNOSIS — G56.00 CARPAL TUNNEL SYNDROME, UNSPECIFIED UPPER LIMB: ICD-10-CM

## 2024-09-12 DIAGNOSIS — M25.531 PAIN IN RIGHT WRIST: ICD-10-CM

## 2024-09-12 DIAGNOSIS — M25.532 PAIN IN RIGHT WRIST: ICD-10-CM

## 2024-09-12 PROCEDURE — 20526 THER INJECTION CARP TUNNEL: CPT | Mod: 50

## 2024-09-12 PROCEDURE — 99204 OFFICE O/P NEW MOD 45 MIN: CPT | Mod: 25

## 2024-09-12 PROCEDURE — 73110 X-RAY EXAM OF WRIST: CPT | Mod: 50

## 2024-09-12 NOTE — HISTORY OF PRESENT ILLNESS
[FreeTextEntry1] : Latoya is a pleasant 29-year-old female who presents today with a severe history of bilateral wrist and hand discomfort.  She is a new mother and for the last 3 weeks she has not slept because of severe symptoms of burning in bilateral hands.

## 2024-09-12 NOTE — PHYSICAL EXAM
[de-identified] : Examination of the [bilateral] wrist reveals discomfort with compression at the level of the volar carpal tunnel eliciting numbness/tingling throughout the fingertips. There is a positive tinel. Patient is able to delineate numbness to median-sided digits volarly. [There is no thenar atrophy]    [de-identified] : [4] views of [bilateral hands and wrists] were obtained today in my office and were seen by me and discussed with the patient.  These are grossly unremarkable

## 2024-09-17 ENCOUNTER — APPOINTMENT (OUTPATIENT)
Dept: OBGYN | Facility: CLINIC | Age: 29
End: 2024-09-17
Payer: MEDICAID

## 2024-09-17 VITALS
SYSTOLIC BLOOD PRESSURE: 110 MMHG | WEIGHT: 172 LBS | BODY MASS INDEX: 31.65 KG/M2 | HEIGHT: 62 IN | DIASTOLIC BLOOD PRESSURE: 70 MMHG

## 2024-09-17 DIAGNOSIS — Z30.430 ENCOUNTER FOR INSERTION OF INTRAUTERINE CONTRACEPTIVE DEVICE: ICD-10-CM

## 2024-09-17 LAB
HCG UR QL: NEGATIVE
QUALITY CONTROL: YES

## 2024-09-17 PROCEDURE — 81025 URINE PREGNANCY TEST: CPT

## 2024-09-17 PROCEDURE — 58300 INSERT INTRAUTERINE DEVICE: CPT

## 2024-10-24 ENCOUNTER — APPOINTMENT (OUTPATIENT)
Dept: ORTHOPEDIC SURGERY | Facility: CLINIC | Age: 29
End: 2024-10-24

## 2024-11-15 ENCOUNTER — APPOINTMENT (OUTPATIENT)
Dept: ORTHOPEDIC SURGERY | Facility: CLINIC | Age: 29
End: 2024-11-15
Payer: MEDICAID

## 2024-11-15 VITALS
WEIGHT: 172 LBS | HEIGHT: 62 IN | BODY MASS INDEX: 31.65 KG/M2 | SYSTOLIC BLOOD PRESSURE: 113 MMHG | HEART RATE: 71 BPM | DIASTOLIC BLOOD PRESSURE: 73 MMHG

## 2024-11-15 DIAGNOSIS — M25.531 PAIN IN RIGHT WRIST: ICD-10-CM

## 2024-11-15 DIAGNOSIS — G56.00 CARPAL TUNNEL SYNDROME, UNSPECIFIED UPPER LIMB: ICD-10-CM

## 2024-11-15 DIAGNOSIS — M25.532 PAIN IN RIGHT WRIST: ICD-10-CM

## 2024-11-15 PROCEDURE — 20526 THER INJECTION CARP TUNNEL: CPT | Mod: 50

## 2024-11-15 PROCEDURE — 99214 OFFICE O/P EST MOD 30 MIN: CPT | Mod: 25

## 2025-01-27 ENCOUNTER — APPOINTMENT (OUTPATIENT)
Dept: ORTHOPEDIC SURGERY | Facility: CLINIC | Age: 30
End: 2025-01-27
Payer: MEDICAID

## 2025-01-27 DIAGNOSIS — M25.531 PAIN IN RIGHT WRIST: ICD-10-CM

## 2025-01-27 DIAGNOSIS — M25.532 PAIN IN RIGHT WRIST: ICD-10-CM

## 2025-01-27 DIAGNOSIS — G56.03 CARPAL TUNNEL SYNDROM,BILATERAL UPPER LIMBS: ICD-10-CM

## 2025-01-27 PROCEDURE — 99214 OFFICE O/P EST MOD 30 MIN: CPT | Mod: 25

## 2025-01-27 PROCEDURE — 20526 THER INJECTION CARP TUNNEL: CPT | Mod: 50

## 2025-01-28 ENCOUNTER — OUTPATIENT (OUTPATIENT)
Dept: OUTPATIENT SERVICES | Facility: HOSPITAL | Age: 30
LOS: 1 days | Discharge: HOME | End: 2025-01-28

## 2025-01-28 DIAGNOSIS — D64.9 ANEMIA, UNSPECIFIED: ICD-10-CM

## 2025-02-04 ENCOUNTER — RESULT REVIEW (OUTPATIENT)
Age: 30
End: 2025-02-04

## 2025-02-04 ENCOUNTER — NON-APPOINTMENT (OUTPATIENT)
Age: 30
End: 2025-02-04

## 2025-02-04 ENCOUNTER — APPOINTMENT (OUTPATIENT)
Dept: HEMATOLOGY ONCOLOGY | Facility: CLINIC | Age: 30
End: 2025-02-04
Payer: MEDICAID

## 2025-02-04 VITALS
BODY MASS INDEX: 31.46 KG/M2 | WEIGHT: 170.97 LBS | TEMPERATURE: 97.4 F | SYSTOLIC BLOOD PRESSURE: 97 MMHG | DIASTOLIC BLOOD PRESSURE: 63 MMHG | OXYGEN SATURATION: 97 % | HEART RATE: 78 BPM | HEIGHT: 62 IN

## 2025-02-04 DIAGNOSIS — O99.019 ANEMIA COMPLICATING PREGNANCY, UNSPECIFIED TRIMESTER: ICD-10-CM

## 2025-02-04 LAB
BASOPHILS # BLD AUTO: 0 K/UL — SIGNIFICANT CHANGE UP (ref 0–0.2)
BASOPHILS NFR BLD AUTO: 0.7 % — SIGNIFICANT CHANGE UP (ref 0–2)
EOSINOPHIL # BLD AUTO: 0.3 K/UL — SIGNIFICANT CHANGE UP (ref 0–0.5)
EOSINOPHIL NFR BLD AUTO: 5 % — SIGNIFICANT CHANGE UP (ref 0–6)
HCT VFR BLD CALC: 40.6 % — SIGNIFICANT CHANGE UP (ref 34.5–45)
HGB BLD-MCNC: 13.3 G/DL — SIGNIFICANT CHANGE UP (ref 11.5–15.5)
LYMPHOCYTES # BLD AUTO: 2.4 K/UL — SIGNIFICANT CHANGE UP (ref 1–3.3)
LYMPHOCYTES # BLD AUTO: 43.2 % — SIGNIFICANT CHANGE UP (ref 13–44)
MCHC RBC-ENTMCNC: 29.5 PG — SIGNIFICANT CHANGE UP (ref 27–34)
MCHC RBC-ENTMCNC: 32.7 G/DL — SIGNIFICANT CHANGE UP (ref 32–36)
MCV RBC AUTO: 90 FL — SIGNIFICANT CHANGE UP (ref 80–100)
MONOCYTES # BLD AUTO: 0.4 K/UL — SIGNIFICANT CHANGE UP (ref 0–0.9)
MONOCYTES NFR BLD AUTO: 6.3 % — SIGNIFICANT CHANGE UP (ref 2–14)
NEUTROPHILS # BLD AUTO: 2.5 K/UL — SIGNIFICANT CHANGE UP (ref 1.8–7.4)
NEUTROPHILS NFR BLD AUTO: 44.9 % — SIGNIFICANT CHANGE UP (ref 43–77)
PLATELET # BLD AUTO: 261 K/UL — SIGNIFICANT CHANGE UP (ref 150–400)
RBC # BLD: 4.51 M/UL — SIGNIFICANT CHANGE UP (ref 3.8–5.2)
RBC # FLD: 11.7 % — SIGNIFICANT CHANGE UP (ref 10.3–14.5)
WBC # BLD: 5.6 K/UL — SIGNIFICANT CHANGE UP (ref 3.8–10.5)
WBC # FLD AUTO: 5.6 K/UL — SIGNIFICANT CHANGE UP (ref 3.8–10.5)

## 2025-02-04 PROCEDURE — T1013: CPT

## 2025-02-04 PROCEDURE — 99214 OFFICE O/P EST MOD 30 MIN: CPT

## 2025-02-05 LAB
FERRITIN SERPL-MCNC: 114 NG/ML
FOLATE SERPL-MCNC: 11.3 NG/ML
IRON SATN MFR SERPL: 28 %
IRON SERPL-MCNC: 88 UG/DL
TIBC SERPL-MCNC: 311 UG/DL
UIBC SERPL-MCNC: 223 UG/DL
VIT B12 SERPL-MCNC: 464 PG/ML

## 2025-04-28 ENCOUNTER — NON-APPOINTMENT (OUTPATIENT)
Age: 30
End: 2025-04-28

## 2025-04-29 ENCOUNTER — APPOINTMENT (OUTPATIENT)
Dept: ORTHOPEDIC SURGERY | Facility: CLINIC | Age: 30
End: 2025-04-29
Payer: MEDICAID

## 2025-04-29 DIAGNOSIS — G56.03 CARPAL TUNNEL SYNDROM,BILATERAL UPPER LIMBS: ICD-10-CM

## 2025-04-29 DIAGNOSIS — M25.641 STIFFNESS OF RIGHT HAND, NOT ELSEWHERE CLASSIFIED: ICD-10-CM

## 2025-04-29 DIAGNOSIS — M25.642 STIFFNESS OF RIGHT HAND, NOT ELSEWHERE CLASSIFIED: ICD-10-CM

## 2025-04-29 PROCEDURE — 99214 OFFICE O/P EST MOD 30 MIN: CPT

## 2025-04-30 ENCOUNTER — OUTPATIENT (OUTPATIENT)
Dept: OUTPATIENT SERVICES | Facility: HOSPITAL | Age: 30
LOS: 1 days | End: 2025-04-30
Payer: MEDICAID

## 2025-04-30 DIAGNOSIS — Z01.818 ENCOUNTER FOR OTHER PREPROCEDURAL EXAMINATION: ICD-10-CM

## 2025-04-30 LAB
A1C WITH ESTIMATED AVERAGE GLUCOSE RESULT: 5.8 % — HIGH (ref 4–5.6)
ANION GAP SERPL CALC-SCNC: 11 MMOL/L — SIGNIFICANT CHANGE UP (ref 5–17)
BASOPHILS # BLD AUTO: 0.01 K/UL — SIGNIFICANT CHANGE UP (ref 0–0.2)
BASOPHILS NFR BLD AUTO: 0.2 % — SIGNIFICANT CHANGE UP (ref 0–2)
BUN SERPL-MCNC: 10.5 MG/DL — SIGNIFICANT CHANGE UP (ref 8–20)
CALCIUM SERPL-MCNC: 8.7 MG/DL — SIGNIFICANT CHANGE UP (ref 8.4–10.5)
CHLORIDE SERPL-SCNC: 104 MMOL/L — SIGNIFICANT CHANGE UP (ref 96–108)
CO2 SERPL-SCNC: 23 MMOL/L — SIGNIFICANT CHANGE UP (ref 22–29)
CREAT SERPL-MCNC: 0.42 MG/DL — LOW (ref 0.5–1.3)
EGFR: 134 ML/MIN/1.73M2 — SIGNIFICANT CHANGE UP
EGFR: 134 ML/MIN/1.73M2 — SIGNIFICANT CHANGE UP
EOSINOPHIL # BLD AUTO: 0.35 K/UL — SIGNIFICANT CHANGE UP (ref 0–0.5)
EOSINOPHIL NFR BLD AUTO: 5.8 % — SIGNIFICANT CHANGE UP (ref 0–6)
ESTIMATED AVERAGE GLUCOSE: 120 MG/DL — HIGH (ref 68–114)
GLUCOSE SERPL-MCNC: 93 MG/DL — SIGNIFICANT CHANGE UP (ref 70–99)
HCG SERPL-ACNC: <4 MIU/ML — SIGNIFICANT CHANGE UP
HCT VFR BLD CALC: 36.9 % — SIGNIFICANT CHANGE UP (ref 34.5–45)
HGB BLD-MCNC: 12 G/DL — SIGNIFICANT CHANGE UP (ref 11.5–15.5)
IMM GRANULOCYTES # BLD AUTO: 0.02 K/UL — SIGNIFICANT CHANGE UP (ref 0–0.07)
IMM GRANULOCYTES NFR BLD AUTO: 0.3 % — SIGNIFICANT CHANGE UP (ref 0–0.9)
LYMPHOCYTES # BLD AUTO: 2.25 K/UL — SIGNIFICANT CHANGE UP (ref 1–3.3)
LYMPHOCYTES NFR BLD AUTO: 37.4 % — SIGNIFICANT CHANGE UP (ref 13–44)
MCHC RBC-ENTMCNC: 29.3 PG — SIGNIFICANT CHANGE UP (ref 27–34)
MCHC RBC-ENTMCNC: 32.5 G/DL — SIGNIFICANT CHANGE UP (ref 32–36)
MCV RBC AUTO: 90 FL — SIGNIFICANT CHANGE UP (ref 80–100)
MONOCYTES # BLD AUTO: 0.41 K/UL — SIGNIFICANT CHANGE UP (ref 0–0.9)
MONOCYTES NFR BLD AUTO: 6.8 % — SIGNIFICANT CHANGE UP (ref 2–14)
NEUTROPHILS # BLD AUTO: 2.98 K/UL — SIGNIFICANT CHANGE UP (ref 1.8–7.4)
NEUTROPHILS NFR BLD AUTO: 49.5 % — SIGNIFICANT CHANGE UP (ref 43–77)
NRBC # BLD AUTO: 0 K/UL — SIGNIFICANT CHANGE UP (ref 0–0)
NRBC # FLD: 0 K/UL — SIGNIFICANT CHANGE UP (ref 0–0)
NRBC BLD AUTO-RTO: 0 /100 WBCS — SIGNIFICANT CHANGE UP (ref 0–0)
PLATELET # BLD AUTO: 246 K/UL — SIGNIFICANT CHANGE UP (ref 150–400)
PMV BLD: 9.8 FL — SIGNIFICANT CHANGE UP (ref 7–13)
POTASSIUM SERPL-MCNC: 4.1 MMOL/L — SIGNIFICANT CHANGE UP (ref 3.5–5.3)
POTASSIUM SERPL-SCNC: 4.1 MMOL/L — SIGNIFICANT CHANGE UP (ref 3.5–5.3)
RBC # BLD: 4.1 M/UL — SIGNIFICANT CHANGE UP (ref 3.8–5.2)
RBC # FLD: 12.7 % — SIGNIFICANT CHANGE UP (ref 10.3–14.5)
SODIUM SERPL-SCNC: 138 MMOL/L — SIGNIFICANT CHANGE UP (ref 135–145)
WBC # BLD: 6.02 K/UL — SIGNIFICANT CHANGE UP (ref 3.8–10.5)
WBC # FLD AUTO: 6.02 K/UL — SIGNIFICANT CHANGE UP (ref 3.8–10.5)

## 2025-04-30 PROCEDURE — 84702 CHORIONIC GONADOTROPIN TEST: CPT

## 2025-04-30 PROCEDURE — 80048 BASIC METABOLIC PNL TOTAL CA: CPT

## 2025-04-30 PROCEDURE — 83036 HEMOGLOBIN GLYCOSYLATED A1C: CPT

## 2025-04-30 PROCEDURE — 85025 COMPLETE CBC W/AUTO DIFF WBC: CPT

## 2025-04-30 PROCEDURE — G0463: CPT

## 2025-04-30 PROCEDURE — 36415 COLL VENOUS BLD VENIPUNCTURE: CPT

## 2025-04-30 NOTE — CHART NOTE - NSCHARTNOTEFT_GEN_A_CORE
All available labs and diagnostics noted as documented. All abnormal labs and diagnostics noted as documented and have been faxed to surgeon.

## 2025-05-05 ENCOUNTER — RESULT REVIEW (OUTPATIENT)
Age: 30
End: 2025-05-05

## 2025-05-05 ENCOUNTER — APPOINTMENT (OUTPATIENT)
Dept: ORTHOPEDIC SURGERY | Facility: AMBULATORY SURGERY CENTER | Age: 30
End: 2025-05-05

## 2025-05-05 PROCEDURE — 29848 WRIST ENDOSCOPY/SURGERY: CPT | Mod: RT

## 2025-05-05 PROCEDURE — 25115 REMOVE WRIST/FOREARM LESION: CPT | Mod: RT

## 2025-05-21 ENCOUNTER — APPOINTMENT (OUTPATIENT)
Dept: ORTHOPEDIC SURGERY | Facility: CLINIC | Age: 30
End: 2025-05-21
Payer: MEDICAID

## 2025-05-21 DIAGNOSIS — G56.01 CARPAL TUNNEL SYNDROME, RIGHT UPPER LIMB: ICD-10-CM

## 2025-05-21 DIAGNOSIS — M25.641 STIFFNESS OF RIGHT HAND, NOT ELSEWHERE CLASSIFIED: ICD-10-CM

## 2025-05-21 DIAGNOSIS — M25.642 STIFFNESS OF RIGHT HAND, NOT ELSEWHERE CLASSIFIED: ICD-10-CM

## 2025-05-21 DIAGNOSIS — G56.02 CARPAL TUNNEL SYNDROME, LEFT UPPER LIMB: ICD-10-CM

## 2025-05-21 PROCEDURE — 99214 OFFICE O/P EST MOD 30 MIN: CPT | Mod: 25

## 2025-05-21 PROCEDURE — 20526 THER INJECTION CARP TUNNEL: CPT | Mod: LT

## 2025-07-30 ENCOUNTER — APPOINTMENT (OUTPATIENT)
Dept: ORTHOPEDIC SURGERY | Facility: CLINIC | Age: 30
End: 2025-07-30

## 2025-07-30 DIAGNOSIS — M25.641 STIFFNESS OF RIGHT HAND, NOT ELSEWHERE CLASSIFIED: ICD-10-CM

## 2025-07-30 DIAGNOSIS — M65.932 UNSPECIFIED SYNOVITIS AND TENOSYNOVITIS, LEFT FOREARM: ICD-10-CM

## 2025-07-30 DIAGNOSIS — M25.642 STIFFNESS OF RIGHT HAND, NOT ELSEWHERE CLASSIFIED: ICD-10-CM

## 2025-07-30 DIAGNOSIS — G56.02 CARPAL TUNNEL SYNDROME, LEFT UPPER LIMB: ICD-10-CM
